# Patient Record
Sex: MALE | Race: WHITE | Employment: OTHER | URBAN - METROPOLITAN AREA
[De-identification: names, ages, dates, MRNs, and addresses within clinical notes are randomized per-mention and may not be internally consistent; named-entity substitution may affect disease eponyms.]

---

## 2017-01-24 ENCOUNTER — HOSPITAL ENCOUNTER (OUTPATIENT)
Dept: LAB | Age: 82
Discharge: HOME | End: 2017-01-24
Payer: MEDICARE

## 2017-01-24 ENCOUNTER — OFFICE VISIT (OUTPATIENT)
Dept: INTERNAL MEDICINE CLINIC | Age: 82
End: 2017-01-24

## 2017-01-24 VITALS
DIASTOLIC BLOOD PRESSURE: 74 MMHG | BODY MASS INDEX: 20.79 KG/M2 | HEIGHT: 74 IN | WEIGHT: 162 LBS | SYSTOLIC BLOOD PRESSURE: 148 MMHG | TEMPERATURE: 98.3 F | OXYGEN SATURATION: 98 % | HEART RATE: 84 BPM | RESPIRATION RATE: 20 BRPM

## 2017-01-24 DIAGNOSIS — E78.2 MIXED HYPERLIPIDEMIA: ICD-10-CM

## 2017-01-24 DIAGNOSIS — F02.80 LATE ONSET ALZHEIMER'S DISEASE WITHOUT BEHAVIORAL DISTURBANCE (HCC): ICD-10-CM

## 2017-01-24 DIAGNOSIS — E55.9 VITAMIN D DEFICIENCY: ICD-10-CM

## 2017-01-24 DIAGNOSIS — I25.10 CORONARY ARTERY DISEASE DUE TO LIPID RICH PLAQUE: ICD-10-CM

## 2017-01-24 DIAGNOSIS — G30.1 LATE ONSET ALZHEIMER'S DISEASE WITHOUT BEHAVIORAL DISTURBANCE (HCC): ICD-10-CM

## 2017-01-24 DIAGNOSIS — R63.4 WEIGHT LOSS: ICD-10-CM

## 2017-01-24 DIAGNOSIS — R41.0 CONFUSION: Primary | ICD-10-CM

## 2017-01-24 DIAGNOSIS — I25.83 CORONARY ARTERY DISEASE DUE TO LIPID RICH PLAQUE: ICD-10-CM

## 2017-01-24 DIAGNOSIS — R45.1 AGITATION: ICD-10-CM

## 2017-01-24 LAB
BILIRUB UR QL STRIP: NEGATIVE
GLUCOSE UR-MCNC: NEGATIVE MG/DL
KETONES P FAST UR STRIP-MCNC: NEGATIVE MG/DL
PH UR STRIP: 6 [PH] (ref 4.6–8)
PROT UR QL STRIP: NORMAL MG/DL
SP GR UR STRIP: 1.02 (ref 1–1.03)
UA UROBILINOGEN AMB POC: NORMAL (ref 0.2–1)
URINALYSIS CLARITY POC: CLEAR
URINALYSIS COLOR POC: YELLOW
URINE BLOOD POC: NEGATIVE
URINE LEUKOCYTES POC: NORMAL
URINE NITRITES POC: NEGATIVE

## 2017-01-24 PROCEDURE — 85025 COMPLETE CBC W/AUTO DIFF WBC: CPT

## 2017-01-24 PROCEDURE — 87086 URINE CULTURE/COLONY COUNT: CPT

## 2017-01-24 PROCEDURE — 80053 COMPREHEN METABOLIC PANEL: CPT

## 2017-01-24 PROCEDURE — 82306 VITAMIN D 25 HYDROXY: CPT

## 2017-01-24 NOTE — MR AVS SNAPSHOT
Visit Information Date & Time Provider Department Dept. Phone Encounter #  
 1/24/2017  1:45 PM Amadou Arroyo MD [facility] Internal Medicine [facility] [phone] [id number] Your Appointments 1/24/2017  1:45 PM  
ACUTE CARE with Amadou Arroyo MD  
[address] ([facility]) Appt Note: irritated and confused [address] [place] [address]. [place] [id number] [phone]  
  
   
 [address] [place] [address]. [address] [place] [phone]  
  
    
 2/21/2017 12:30 PM  
ROUTINE CARE with Amadou Arroyo MD  
[address] ([facility]) Appt Note: 1 month follow up [address] [place] [address]. [place] [id number] [phone]  
  
   
 [address] [place] [address]. [address] [place] [phone] Upcoming Health Maintenance Date Due DTaP/Tdap/Td series (1 - Tdap) 2/3/1955 Pneumococcal 65+ Low/Medium Risk (1 of 2 - PCV13) 2/3/1999 INFLUENZA AGE 9 TO ADULT 8/1/2016 MEDICARE YEARLY EXAM 11/4/2016 GLAUCOMA SCREENING Q2Y 4/18/2018 Allergies as of 1/24/2017  Review Complete On: 1/24/2017 By: Amadou Arroyo MD  
  
 Severity Noted Reaction Type Reactions Chocolate [Cocoa]  12/16/2014    Sneezing Namenda [Memantine]  12/16/2014    Shortness of Breath Current Immunizations  Reviewed on 3/12/2015 Name Date Influenza Vaccine 11/1/2014 Zoster Vaccine, Live 12/23/2014 Not reviewed this visit You Were Diagnosed With   
  
 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  
 20.8 kg/m 2 1.96 m 2 Preferred Pharmacy Pharmacy Name Phone [facility] [phone] Your Updated Medication List  
  
   
This list is accurate as of: 1/24/17 12:33 PM.  Always use your most recent med list.  
  
  
  
  
 aspirin 325 mg tablet Commonly known as:  ASPIRIN Take 325 mg by mouth daily. atenolol 25 mg tablet Commonly known as:  TENORMIN Take 25 mg by mouth daily. donepezil 10 mg tablet Commonly known as:  ARICEPT Take 10 mg by mouth nightly. food supplemt, lactose-reduced Liqd Commonly known as:  ENSURE Take 237 mL by mouth daily. hydrOXYzine HCl 10 mg tablet Commonly known as:  ATARAX Take  by mouth three (3) times daily as needed for Itching. LEXAPRO 10 mg tablet Generic drug:  escitalopram oxalate Take 10 mg by mouth daily. Take 1/2 daily  
  
 ofloxacin 0.3 % ophthalmic solution Commonly known as:  FLOXIN Administer 3 Drops to both eyes four (4) times daily. prednisoLONE acetate 1 % ophthalmic suspension Commonly known as:  PRED FORTE Administer 1 Drop to both eyes four (4) times daily. simvastatin 20 mg tablet Commonly known as:  ZOCOR  
TAKE 1 TABLET BY MOUTH NIGHTLY. STOP ZOCOR 40MG. therapeutic multivitamin tablet Commonly known as:  [facility] Take 1 Tab by mouth daily. Prescriptions Sent to Pharmacy Refills food supplemt, lactose-reduced (ENSURE) liqd 3 Sig: Take 237 mL by mouth daily. Class: Normal  
 Pharmacy: [address]  #: [phone] Route: Oral  
  
We Performed the Following AMB POC URINALYSIS DIP STICK MANUAL W/O MICRO [02306 CPT(R)] CBC WITH AUTOMATED DIFF [55211 CPT(R)] CULTURE, URINE [id number] CPT(R)] METABOLIC PANEL, COMPREHENSIVE [06777 CPT(R)] VITAMIN D, 25 HYDROXY [id number] CPT(R)] Patient Instructions Helping A Person With Dementia: Care Instructions Your Care Instructions Dementia is a loss of mental skills that affects daily life. It is different from mild memory loss that occurs with aging. Dementia can cause problems with memory, thinking clearly, and planning. It is different for everyone. But it usually gets worse slowly. Some people who have dementia can function well for a long time. But at some point it may become hard for the person to care for himself or herself. It can be upsetting to learn that a loved one has this condition. You may be afraid and worried about what will happen. You may wonder how you will care for the person. There is no cure for dementia. But medicine may be able to slow memory loss and improve thinking for a while. Other medicines may help with sleep, depression, and behavior changes. Dementia is different for everyone. In some cases, people can function well for a long time. You can help your loved one by making his or her home life easier and safer. You also need to take care of yourself. Caregiving can be stressful. But support is available to help you and give you a break when you need it. The Alzheimer's Association offers good information and support. If you are caring for someone with dementia, you can help make life safer and more comfortable. You can also help your loved one make decisions about future care. You may also want to bring up legal and financial issues. These are hard but important conversations to have. Follow-up care is a key part of your loved one's treatment and safety. Be sure to make and go to all appointments, and call your doctor if your loved one is having problems. It's also a good idea to know your loved one's test results and keep a list of the medicines he or she takes. How can you care for your loved one at home? Taking care of the person · If the person takes medicine for dementia, help him or her take it exactly as prescribed. Call the doctor if you notice any problems with the medicine. · Make a list of the person's medicines. Review it with all of his or her doctors. · Help the person eat a balanced diet. Serve plenty of whole grains, fruits, and vegetables every day. If the person is not hungry at mealtimes, give snacks at midmorning and in the afternoon. Offer drinks such as Boost, Ensure, or Sustacal if the person is losing weight. · Encourage exercise. Walking and other activities may slow the decline of mental ability. Help the person stay active mentally with reading, crossword puzzles, or other hobbies. · Take steps to help if the person is sundowning. This is the restless behavior and trouble with sleeping that may occur in late afternoon and at night. Try not to let the person nap during the day. Offer a glass of warm milk or caffeine-free tea before bedtime. · Develop a routine. Your loved one will feel less frustrated or confused with a clear, simple plan of what to do every day. · Be patient. A task may take the person longer than it used to. · For as long as he or she is able, allow your loved one to make decisions about activities, food, clothing, and other choices. Let him or her be independent, even if tasks take more time or are not done perfectly. Tailor tasks to the person's abilities. For example, if cooking is no longer safe, ask for other help.  Your loved one can help set the table, or make simple dishes such as a salad. When the person needs help, offer it gently. Staying safe · Make your home (or your loved one's home) safe. Tack down rugs, and put no-slip tape in the tub. Install handrails, and put safety switches on stoves and appliances. Keep rooms free of clutter. Make sure walkways around furniture are clear. Do not move furniture around, because the person may become confused. · Use locks on doors and cupboards. Lock up knives, scissors, medicines, cleaning supplies, and other dangerous things. · Do not let the person drive or cook if he or she can't do it safely. A person with dementia should not drive unless he or she is able to pass an on-road driving test. Your state 's license bureau can do a driving test if there is any question. · Get medical alert jewelry for the person so that you can be contacted if he or she wanders away. If possible, provide a safe place for wandering, such as an enclosed yard or garden. Taking care of yourself · Ask your doctor about support groups and other resources in your area. · Take care of your health. Be sure to eat healthy foods and get enough rest and exercise. · Take time for yourself. Respite services provide someone to stay with the person for a short time while you get out of the house for a few hours. · Make time for an activity that you enjoy. Read, listen to music, paint, do crafts, or play an instrument, even if it's only for a few minutes a day. · Spend time with family, friends, and others in your support system. When should you call for help? Call 911 anytime you think the person may need emergency care. For example, call if: · The person who has dementia wanders away and you can't find him or her. · The person who has dementia is seriously injured. Call the doctor now or seek immediate medical care if: · The person suddenly sees things that are not there (hallucinates). · The person has a sudden change in his or her behavior. Watch closely for changes in the person's health, and be sure to contact the doctor if: · The person has symptoms that could cause injury. · The person has problems with his or her medicine. · You need more information to care for a person with dementia. · You need respite care so you can take a break. Where can you learn more? Go to http://kathe-millicent.info/. Enter R935 in the search box to learn more about \"Helping A Person With Dementia: Care Instructions. \" Current as of: July 26, 2016 Content Version: 11.1 © 6293-3375 [employer]. Care instructions adapted under license by [employer] (which disclaims liability or warranty for this information). If you have questions about a medical condition or this instruction, always ask your healthcare professional. [address] any warranty or liability for your use of this information. Introducing [facility]! Katiuska Quintana introduces Qlibri patient portal. Now you can access parts of your medical record, email your doctor's office, and request medication refills online. 1. In your internet browser, go to [email or web address] 2. Click on the First Time User? Click Here link in the Sign In box. You will see the New Member Sign Up page. 3. Enter your Qlibri Access Code exactly as it appears below. You will not need to use this code after youve completed the sign-up process. If you do not sign up before the expiration date, you must request a new code. · Qlibri Access Code: [email or web address] Expires: 4/24/2017 12:24 PM 
 
4. Enter the last four digits of your Social Security Number (xxxx) and Date of Birth (mm/dd/yyyy) as indicated and click Submit. You will be taken to the next sign-up page. 5. Create a Qlibri ID.  This will be your Qlibri login ID and cannot be changed, so think of one that is secure and easy to remember. 6. Create a Dolosys password. You can change your password at any time. 7. Enter your Password Reset Question and Answer. This can be used at a later time if you forget your password. 8. Enter your e-mail address. You will receive e-mail notification when new information is available in [address] 9. Click Sign Up. You can now view and download portions of your medical record. 10. Click the Download Summary menu link to download a portable copy of your medical information. If you have questions, please visit the Frequently Asked Questions section of the Dolosys website. Remember, Dolosys is NOT to be used for urgent needs. For medical emergencies, dial 911. Now available from your iPhone and Android! Please provide this summary of care documentation to your next provider. Your primary care clinician is listed as Darrel Mendoza. If you have any questions after today's visit, please call [phone].

## 2017-01-24 NOTE — PROGRESS NOTES
Health Maintenance Due   Topic Date Due    DTaP/Tdap/Td series (1 - Tdap) 02/03/1955    Pneumococcal 65+ Low/Medium Risk (1 of 2 - PCV13) 02/03/1999    INFLUENZA AGE 9 TO ADULT  08/01/2016    MEDICARE YEARLY EXAM  11/04/2016       Chief Complaint   Patient presents with    Altered mental status     agitated and aggressive and confused       1. Have you been to the ER, urgent care clinic since your last visit? Hospitalized since your last visit? No    2. Have you seen or consulted any other health care providers outside of the [address] [place] [address] since your last visit? Include any pap smears or colon screening. No    3) Do you have an Advance Directive on file? no    4) Are you interested in receiving information on Advance Directives? NO      Patient is accompanied by self I have received verbal consent from [facility] to discuss any/all medical information while they are present in the room.       Results for orders placed or performed in visit on 01/24/17   AMB POC URINALYSIS DIP STICK MANUAL W/O MICRO   Result Value Ref Range    Color (UA POC) Yellow     Clarity (UA POC) Clear     Glucose (UA POC) Negative Negative    Bilirubin (UA POC) Negative Negative    Ketones (UA POC) Negative Negative    Specific gravity (UA POC) 1.025 1.001 - 1.035    Blood (UA POC) Negative Negative    pH (UA POC) 6.0 4.6 - 8.0    Protein (UA POC) Trace Negative mg/dL    Urobilinogen (UA POC) 0.2 mg/dL 0.2 - 1    Nitrites (UA POC) Negative Negative    Leukocyte esterase (UA POC) Trace Negative

## 2017-01-24 NOTE — PROGRESS NOTES
HISTORY OF PRESENT ILLNESS  Shahana Correia is a 80 y.o. male here  With agitation. I don't think he is confused. no frequency of urination. he is frustrated about food served in dining room. he has lost 20 pound weight. Have CAD,stable. on asa.no chest pain or SOB. Depression is stable. has memory problem. doing well.will see psych soon. Reports compliance with medications and diet. Med list and most recent labs/studies reviewed with pt. Not active physically to control weight. Needs med refills. Reports no other new c/o. Altered mental status    His past medical history is significant for hypertension. Weight Loss     Hypertension    Pertinent negatives include no blurred vision. Diarrhea    Pertinent negatives include no chills. Dementia    His past medical history is significant for hypertension. Palpitations   Pertinent negatives include no fever. His past medical history is significant for hypertension. Review of Systems   Constitutional: Negative. Negative for chills and fever. HENT: Negative. Eyes: Negative. Negative for blurred vision, double vision and photophobia. Respiratory: Negative. Cardiovascular: Negative. Gastrointestinal: Negative. Genitourinary: Negative. Musculoskeletal: Negative. Skin: Negative. Neurological: Negative. Endo/Heme/Allergies: Negative. Psychiatric/Behavioral: Positive for depression and memory loss. Negative for substance abuse and suicidal ideas. Physical Exam   Constitutional: He appears well-developed and well-nourished. No distress. Neck: Normal range of motion. Neck supple. No JVD present. No thyromegaly present. Cardiovascular: Normal rate, regular rhythm, normal heart sounds and intact distal pulses. Pulmonary/Chest: Effort normal and breath sounds normal. No respiratory distress. He has no wheezes. He has no rales. Abdominal: Soft. Bowel sounds are normal. He exhibits no distension. There is no tenderness.    KUB NT Musculoskeletal: He exhibits no edema or tenderness. Lymphadenopathy:     He has no cervical adenopathy. Psychiatric: He has a normal mood and affect. His behavior is normal.   Depression. demented       ASSESSMENT and PLAN  Phyllis Cline was seen today for altered mental status, weight loss and hypertension. Diagnoses and all orders for this visit:    Confusion  -     AMB POC URINALYSIS DIP STICK MANUAL W/O MICRO  tarce blood and leuc. Will sent out,  -     CULTURE, URINE    Agitation  I think he  is upset about food offered in cafeteria. Will do,  -     CULTURE, URINE  -     CBC WITH AUTOMATED DIFF  -     METABOLIC PANEL, COMPREHENSIVE    Late onset Alzheimer's disease without behavioral disturbance  Adv on med. He will see  psychitrist.adv his daughter to talk about exelon instead of aricept. Coronary artery disease due to lipid rich plaque    Stable. -     CBC WITH AUTOMATED DIFF  -     METABOLIC PANEL, COMPREHENSIVE    Mixed hyperlipidemia  -     METABOLIC PANEL, COMPREHENSIVE    Vitamin D deficiency  -     VITAMIN D, 25 HYDROXY    Weight loss    Lost 20 pound weight. Will add,  -     food supplemt, lactose-reduced (ENSURE) liqd; Take 237 mL by mouth daily. Return if symptoms worsen or fail to improve. Advised him to call back or return to office if symptoms worsen/change/persist.   Discussed expected course/resolution/complications of diagnosis in detail with patient. Medication risks/benefits/costs/interactions/alternatives discussed with patient. He was given an after visit summary which includes diagnoses, current medications, & vitals. He expressed understanding with the diagnosis and plan.

## 2017-01-24 NOTE — PATIENT INSTRUCTIONS
Helping A Person With Dementia: Care Instructions  Your Care Instructions  Dementia is a loss of mental skills that affects daily life. It is different from mild memory loss that occurs with aging. Dementia can cause problems with memory, thinking clearly, and planning. It is different for everyone. But it usually gets worse slowly. Some people who have dementia can function well for a long time. But at some point it may become hard for the person to care for himself or herself. It can be upsetting to learn that a loved one has this condition. You may be afraid and worried about what will happen. You may wonder how you will care for the person. There is no cure for dementia. But medicine may be able to slow memory loss and improve thinking for a while. Other medicines may help with sleep, depression, and behavior changes. Dementia is different for everyone. In some cases, people can function well for a long time. You can help your loved one by making his or her home life easier and safer. You also need to take care of yourself. Caregiving can be stressful. But support is available to help you and give you a break when you need it. The Alzheimer's Association offers good information and support. If you are caring for someone with dementia, you can help make life safer and more comfortable. You can also help your loved one make decisions about future care. You may also want to bring up legal and financial issues. These are hard but important conversations to have. Follow-up care is a key part of your loved one's treatment and safety. Be sure to make and go to all appointments, and call your doctor if your loved one is having problems. It's also a good idea to know your loved one's test results and keep a list of the medicines he or she takes. How can you care for your loved one at home? Taking care of the person  · If the person takes medicine for dementia, help him or her take it exactly as prescribed. Call the doctor if you notice any problems with the medicine. · Make a list of the person's medicines. Review it with all of his or her doctors. · Help the person eat a balanced diet. Serve plenty of whole grains, fruits, and vegetables every day. If the person is not hungry at mealtimes, give snacks at midmorning and in the afternoon. Offer drinks such as Boost, Ensure, or Sustacal if the person is losing weight. · Encourage exercise. Walking and other activities may slow the decline of mental ability. Help the person stay active mentally with reading, crossword puzzles, or other hobbies. · Take steps to help if the person is sundowning. This is the restless behavior and trouble with sleeping that may occur in late afternoon and at night. Try not to let the person nap during the day. Offer a glass of warm milk or caffeine-free tea before bedtime. · Develop a routine. Your loved one will feel less frustrated or confused with a clear, simple plan of what to do every day. · Be patient. A task may take the person longer than it used to. · For as long as he or she is able, allow your loved one to make decisions about activities, food, clothing, and other choices. Let him or her be independent, even if tasks take more time or are not done perfectly. Tailor tasks to the person's abilities. For example, if cooking is no longer safe, ask for other help. Your loved one can help set the table, or make simple dishes such as a salad. When the person needs help, offer it gently. Staying safe  · Make your home (or your loved one's home) safe. Tack down rugs, and put no-slip tape in the tub. Install handrails, and put safety switches on stoves and appliances. Keep rooms free of clutter. Make sure walkways around furniture are clear. Do not move furniture around, because the person may become confused. · Use locks on doors and cupboards.  Lock up knives, scissors, medicines, cleaning supplies, and other dangerous things. · Do not let the person drive or cook if he or she can't do it safely. A person with dementia should not drive unless he or she is able to pass an on-road driving test. Your state 's license bureau can do a driving test if there is any question. · Get medical alert jewelry for the person so that you can be contacted if he or she wanders away. If possible, provide a safe place for wandering, such as an enclosed yard or garden. Taking care of yourself  · Ask your doctor about support groups and other resources in your area. · Take care of your health. Be sure to eat healthy foods and get enough rest and exercise. · Take time for yourself. Respite services provide someone to stay with the person for a short time while you get out of the house for a few hours. · Make time for an activity that you enjoy. Read, listen to music, paint, do crafts, or play an instrument, even if it's only for a few minutes a day. · Spend time with family, friends, and others in your support system. When should you call for help? Call 911 anytime you think the person may need emergency care. For example, call if:  · The person who has dementia wanders away and you can't find him or her. · The person who has dementia is seriously injured. Call the doctor now or seek immediate medical care if:  · The person suddenly sees things that are not there (hallucinates). · The person has a sudden change in his or her behavior. Watch closely for changes in the person's health, and be sure to contact the doctor if:  · The person has symptoms that could cause injury. · The person has problems with his or her medicine. · You need more information to care for a person with dementia. · You need respite care so you can take a break. Where can you learn more? Go to http://kathe-millicent.info/. Enter R366 in the search box to learn more about \"Helping A Person With Dementia: Care Instructions. \"  Current as of: July 26, 2016  Content Version: 11.1  © 9451-6340 [employer]. Care instructions adapted under license by [employer] (which disclaims liability or warranty for this information). If you have questions about a medical condition or this instruction, always ask your healthcare professional. [place] [address] any warranty or liability for your use of this information.

## 2017-01-24 NOTE — LETTER
1/26/2017 11:56 AM 
 
Mr. Eleazar Bustos [address] [facility] [address] [id number] Dear Sadia Kenyon: 
 
Please find your most recent results below. Resulted Orders AMB POC URINALYSIS DIP STICK MANUAL W/O MICRO Result Value Ref Range Color (UA POC) Yellow Clarity (UA POC) Clear Glucose (UA POC) Negative Negative Bilirubin (UA POC) Negative Negative Ketones (UA POC) Negative Negative Specific gravity (UA POC) 1.025 1.001 - 1.035 Blood (UA POC) Negative Negative pH (UA POC) 6.0 4.6 - 8.0 Protein (UA POC) Trace Negative mg/dL Urobilinogen (UA POC) 0.2 mg/dL 0.2 - 1 Nitrites (UA POC) Negative Negative Leukocyte esterase (UA POC) Trace Negative CULTURE, URINE Result Value Ref Range Urine Culture, Routine No growth Narrative Performed at:  [address] [place] [address]  [id number] : Edwar Cotto MD, Phone:  [phone] CBC WITH AUTOMATED DIFF Result Value Ref Range WBC 8.1 3.4 - 10.8 x10E3/uL  
 RBC 4.45 4.14 - 5.80 x10E6/uL HGB 14.1 12.6 - 17.7 g/dL HCT 42.3 37.5 - 51.0 % MCV 95 79 - 97 fL  
 MCH 31.7 26.6 - 33.0 pg  
 MCHC 33.3 31.5 - 35.7 g/dL  
 RDW 14.0 12.3 - 15.4 % PLATELET 644 217 - 634 x10E3/uL NEUTROPHILS 62 % Lymphocytes 26 % MONOCYTES 10 % EOSINOPHILS 2 % BASOPHILS 0 %  
 ABS. NEUTROPHILS 5.0 1.4 - 7.0 x10E3/uL Abs Lymphocytes 2.1 0.7 - 3.1 x10E3/uL  
 ABS. MONOCYTES 0.8 0.1 - 0.9 x10E3/uL  
 ABS. EOSINOPHILS 0.1 0.0 - 0.4 x10E3/uL  
 ABS. BASOPHILS 0.0 0.0 - 0.2 x10E3/uL IMMATURE GRANULOCYTES 0 %  
 ABS. IMM. GRANS. 0.0 0.0 - 0.1 x10E3/uL Narrative Performed at:  [facility] [address] [place] [address]  [id number] : Lena Garcia MD, Phone:  [phone] METABOLIC PANEL, COMPREHENSIVE Result Value Ref Range Glucose 95 65 - 99 mg/dL BUN 17 8 - 27 mg/dL Creatinine 1.15 0.76 - 1.27 mg/dL GFR est non-AA 59 (L) >59 mL/min/1.73 GFR est AA 68 >59 mL/min/1.73  
 BUN/Creatinine ratio 15 10 - 22 Sodium 143 134 - 144 mmol/L Potassium 4.6 3.5 - 5.2 mmol/L Chloride 104 96 - 106 mmol/L  
 CO2 24 18 - 29 mmol/L Calcium 9.2 8.6 - 10.2 mg/dL Protein, total 6.5 6.0 - 8.5 g/dL Albumin 4.1 3.5 - 4.7 g/dL GLOBULIN, TOTAL 2.4 1.5 - 4.5 g/dL A-G Ratio 1.7 1.1 - 2.5 Bilirubin, total 1.0 0.0 - 1.2 mg/dL Alk. phosphatase 62 39 - 117 IU/L  
 AST 19 0 - 40 IU/L  
 ALT 13 0 - 44 IU/L Narrative Performed at:  [address]  [id number] : Martinez Mustafa MD, Phone:  [phone] VITAMIN D, 25 HYDROXY Result Value Ref Range VITAMIN D, 25-HYDROXY 38.9 30.0 - 100.0 ng/mL Comment:  
   Vitamin D deficiency has been defined by the [address] practice guideline as a 
level of serum 25-OH vitamin D less than 20 ng/mL (1,2). The Endocrine Society went on to further define vitamin D 
insufficiency as a level between 21 and 29 ng/mL (2). 1. IOM ([place] [employer]). 2010. Dietary reference 
   intakes for calcium and D. [facility]: The 
   [employer]. 2. Baylee MF, Miranda NC, Lavonne NIETO, et al. 
   Evaluation, treatment, and prevention of vitamin D 
   deficiency: an Endocrine Society clinical practice 
   guideline. JCEM. 2011 Jul; 96(7):1911-30. Narrative Performed at:  [address]  [id number] : Martinez Mustafa MD, Phone:  [phone] CKD REPORT Result Value Ref Range Interpretation Note Comment:  
   Supplement report is available. Narrative Performed at:  [address] [place] [address]  [id number] : Talha Guadalupe PhD, Phone:  [phone] RECOMMENDATIONS: 
None. Keep up the good work! Please call me if you have any questions: [phone] Sincerely, Rahul Rich MD

## 2017-01-25 ENCOUNTER — OFFICE VISIT (OUTPATIENT)
Dept: INTERNAL MEDICINE CLINIC | Age: 82
End: 2017-01-25

## 2017-01-25 VITALS
DIASTOLIC BLOOD PRESSURE: 52 MMHG | RESPIRATION RATE: 20 BRPM | BODY MASS INDEX: 22.07 KG/M2 | TEMPERATURE: 97.9 F | WEIGHT: 172 LBS | OXYGEN SATURATION: 97 % | HEART RATE: 72 BPM | SYSTOLIC BLOOD PRESSURE: 104 MMHG | HEIGHT: 74 IN

## 2017-01-25 DIAGNOSIS — K11.20 PAROTITIS: Primary | ICD-10-CM

## 2017-01-25 LAB
25(OH)D3+25(OH)D2 SERPL-MCNC: 38.9 NG/ML (ref 30–100)
ALBUMIN SERPL-MCNC: 4.1 G/DL (ref 3.5–4.7)
ALBUMIN/GLOB SERPL: 1.7 {RATIO} (ref 1.1–2.5)
ALP SERPL-CCNC: 62 IU/L (ref 39–117)
ALT SERPL-CCNC: 13 IU/L (ref 0–44)
AST SERPL-CCNC: 19 IU/L (ref 0–40)
BACTERIA UR CULT: NO GROWTH
BASOPHILS # BLD AUTO: 0 X10E3/UL (ref 0–0.2)
BASOPHILS NFR BLD AUTO: 0 %
BILIRUB SERPL-MCNC: 1 MG/DL (ref 0–1.2)
BUN SERPL-MCNC: 17 MG/DL (ref 8–27)
BUN/CREAT SERPL: 15 (ref 10–22)
CALCIUM SERPL-MCNC: 9.2 MG/DL (ref 8.6–10.2)
CHLORIDE SERPL-SCNC: 104 MMOL/L (ref 96–106)
CO2 SERPL-SCNC: 24 MMOL/L (ref 18–29)
CREAT SERPL-MCNC: 1.15 MG/DL (ref 0.76–1.27)
EOSINOPHIL # BLD AUTO: 0.1 X10E3/UL (ref 0–0.4)
EOSINOPHIL NFR BLD AUTO: 2 %
ERYTHROCYTE [DISTWIDTH] IN BLOOD BY AUTOMATED COUNT: 14 % (ref 12.3–15.4)
GLOBULIN SER CALC-MCNC: 2.4 G/DL (ref 1.5–4.5)
GLUCOSE SERPL-MCNC: 95 MG/DL (ref 65–99)
HCT VFR BLD AUTO: 42.3 % (ref 37.5–51)
HGB BLD-MCNC: 14.1 G/DL (ref 12.6–17.7)
IMM GRANULOCYTES # BLD: 0 X10E3/UL (ref 0–0.1)
IMM GRANULOCYTES NFR BLD: 0 %
INTERPRETATION: NORMAL
LYMPHOCYTES # BLD AUTO: 2.1 X10E3/UL (ref 0.7–3.1)
LYMPHOCYTES NFR BLD AUTO: 26 %
MCH RBC QN AUTO: 31.7 PG (ref 26.6–33)
MCHC RBC AUTO-ENTMCNC: 33.3 G/DL (ref 31.5–35.7)
MCV RBC AUTO: 95 FL (ref 79–97)
MONOCYTES # BLD AUTO: 0.8 X10E3/UL (ref 0.1–0.9)
MONOCYTES NFR BLD AUTO: 10 %
NEUTROPHILS # BLD AUTO: 5 X10E3/UL (ref 1.4–7)
NEUTROPHILS NFR BLD AUTO: 62 %
PLATELET # BLD AUTO: 238 X10E3/UL (ref 150–379)
POTASSIUM SERPL-SCNC: 4.6 MMOL/L (ref 3.5–5.2)
PROT SERPL-MCNC: 6.5 G/DL (ref 6–8.5)
RBC # BLD AUTO: 4.45 X10E6/UL (ref 4.14–5.8)
SODIUM SERPL-SCNC: 143 MMOL/L (ref 134–144)
WBC # BLD AUTO: 8.1 X10E3/UL (ref 3.4–10.8)

## 2017-01-25 RX ORDER — AMOXICILLIN AND CLAVULANATE POTASSIUM 875; 125 MG/1; MG/1
1 TABLET, FILM COATED ORAL EVERY 12 HOURS
Qty: 20 TAB | Refills: 0 | Status: SHIPPED | OUTPATIENT
Start: 2017-01-25 | End: 2017-02-21 | Stop reason: ALTCHOICE

## 2017-01-25 NOTE — PATIENT INSTRUCTIONS
Parotitis: Care Instructions  Your Care Instructions  Parotitis is a painful swelling of your parotid glands, which are salivary glands located between the ear and jaw. The most common cause is a virus, such as mumps, herpes, or Olga-Barr. Bacterial infections, diabetes, tumors or stones in the saliva glands, and tooth problems also may cause parotitis. Follow-up care is a key part of your treatment and safety. Be sure to make and go to all appointments, and call your doctor if you are having problems. It's also a good idea to know your test results and keep a list of the medicines you take. How can you care for yourself at home? · Use an over-the-counter pain medicine if needed, such as acetaminophen (Tylenol), ibuprofen (Advil, Motrin), or naproxen (Aleve). Be safe with medicines. Read and follow all instructions on the label. Do not give aspirin to anyone younger than 20. It has been linked to Reye syndrome, a serious illness. · Put an ice or heat pack (whichever feels better) on the swollen jaw for 10 to 20 minutes at a time. Put a thin cloth between the ice or heat pack and the skin. · Suck on ice chips or ice treats such as Popsicles. Eat soft foods that do not have to be chewed much. Do not eat sour foods or liquids. If your salivary glands are very sore, eating these foods will usually cause them to hurt more. · If your doctor prescribed antibiotics, take them as directed. Do not stop taking them just because you feel better. You need to take the full course of antibiotics. To prevent tooth problems  · Brush and floss every day, and have regular dental checkups. · Eat a healthy diet, and avoid sugary foods and drinks. · Do not smoke or use spit tobacco. Tobacco use slows your ability to heal. It also increases your risk for gum disease and cancer of the mouth and throat. If you need help quitting, talk to your doctor about stop-smoking programs and medicines.  These can increase your chances of quitting for good. When should you call for help? Call 911 anytime you think you may need emergency care. For example, call if:  · You are confused, you do not know where you are, or you are extremely sleepy or hard to wake up. · You have a seizure. Call your doctor now or seek immediate medical care if:  · You have belly pain. · You have a fever with a stiff neck or a severe headache. · Your fever goes up. · Your testicles hurt and are tender. · You have trouble opening your mouth. · You have trouble breathing. · You have increased trouble swallowing. Watch closely for changes in your health, and be sure to contact your doctor if:  · You do not feel better after 10 days of home treatment. Where can you learn more? Go to http://kathe-millicent.info/. Enter H335 in the search box to learn more about \"Parotitis: Care Instructions. \"  Current as of: July 29, 2016  Content Version: 11.1  © 7807-5000 [employer]. Care instructions adapted under license by [employer] (which disclaims liability or warranty for this information). If you have questions about a medical condition or this instruction, always ask your healthcare professional. [place] [address] any warranty or liability for your use of this information.

## 2017-01-25 NOTE — MR AVS SNAPSHOT
Visit Information Date & Time Provider Department Dept. Phone Encounter #  
 1/25/2017  2:00 PM Amarilis Ledbetter, [facility] [phone] [id number] Your Appointments 2/21/2017 12:30 PM  
ROUTINE CARE with Elayne Soulier, MD  
[address] ([facility]) Appt Note: 1 month follow up [address] [place] [address]. [place] [id number] [phone]  
  
   
 [address] [place] [address]. [address] [place] [phone] Upcoming Health Maintenance Date Due DTaP/Tdap/Td series (1 - Tdap) 2/3/1955 Pneumococcal 65+ Low/Medium Risk (1 of 2 - PCV13) 2/3/1999 INFLUENZA AGE 9 TO ADULT 8/1/2016 MEDICARE YEARLY EXAM 11/4/2016 GLAUCOMA SCREENING Q2Y 4/18/2018 Allergies as of 1/25/2017  Review Complete On: 1/25/2017 By: Amarilis Ledbetter NP Severity Noted Reaction Type Reactions Chocolate [Cocoa]  12/16/2014    Sneezing Namenda [Memantine]  12/16/2014    Shortness of Breath Current Immunizations  Reviewed on 3/12/2015 Name Date Influenza Vaccine 11/1/2014 Zoster Vaccine, Live 12/23/2014 Not reviewed this visit You Were Diagnosed With   
  
 Codes Comments Parotitis    -  Primary ICD-10-CM: K11.20 ICD-9-CM: 527.2 Vitals BP Pulse Temp Resp Height(growth percentile) Weight(growth percentile) 104/52 72 97.9 °F (36.6 °C) (Oral) 20 6' 2\" (1.88 m) 172 lb (78 kg) SpO2 BMI Smoking Status 97% 22.08 kg/m2 Former Smoker BMI and BSA Data Body Mass Index Body Surface Area 22.08 kg/m 2 2.02 m 2 Preferred Pharmacy Pharmacy Name Phone [facility]. [phone] Your Updated Medication List  
  
   
This list is accurate as of: 1/25/17  2:30 PM.  Always use your most recent med list.  
  
  
  
  
 amoxicillin-clavulanate 875-125 mg per tablet Commonly known as:  AUGMENTIN  
 Take 1 Tab by mouth every twelve (12) hours. aspirin 325 mg tablet Commonly known as:  ASPIRIN Take 325 mg by mouth daily. atenolol 25 mg tablet Commonly known as:  TENORMIN Take 25 mg by mouth daily. donepezil 10 mg tablet Commonly known as:  ARICEPT Take 10 mg by mouth nightly. food supplemt, lactose-reduced Liqd Commonly known as:  ENSURE Take 237 mL by mouth daily. hydrOXYzine HCl 10 mg tablet Commonly known as:  ATARAX Take  by mouth three (3) times daily as needed for Itching. LEXAPRO 10 mg tablet Generic drug:  escitalopram oxalate Take 10 mg by mouth daily. Take 1/2 daily  
  
 ofloxacin 0.3 % ophthalmic solution Commonly known as:  FLOXIN Administer 3 Drops to both eyes four (4) times daily. prednisoLONE acetate 1 % ophthalmic suspension Commonly known as:  PRED FORTE Administer 1 Drop to both eyes four (4) times daily. simvastatin 20 mg tablet Commonly known as:  ZOCOR  
TAKE 1 TABLET BY MOUTH NIGHTLY. STOP ZOCOR 40MG. therapeutic multivitamin tablet Commonly known as:  [facility] Take 1 Tab by mouth daily. Prescriptions Sent to Pharmacy Refills  
 amoxicillin-clavulanate (AUGMENTIN) 875-125 mg per tablet 0 Sig: Take 1 Tab by mouth every twelve (12) hours. Class: Normal  
 Pharmacy: [address] Ph #: [phone] Route: Oral  
  
Patient Instructions Parotitis: Care Instructions Your Care Instructions Parotitis is a painful swelling of your parotid glands, which are salivary glands located between the ear and jaw. The most common cause is a virus, such as mumps, herpes, or Olga-Barr. Bacterial infections, diabetes, tumors or stones in the saliva glands, and tooth problems also may cause parotitis. Follow-up care is a key part of your treatment and safety.  Be sure to make and go to all appointments, and call your doctor if you are having problems. It's also a good idea to know your test results and keep a list of the medicines you take. How can you care for yourself at home? · Use an over-the-counter pain medicine if needed, such as acetaminophen (Tylenol), ibuprofen (Advil, Motrin), or naproxen (Aleve). Be safe with medicines. Read and follow all instructions on the label. Do not give aspirin to anyone younger than 20. It has been linked to Reye syndrome, a serious illness. · Put an ice or heat pack (whichever feels better) on the swollen jaw for 10 to 20 minutes at a time. Put a thin cloth between the ice or heat pack and the skin. · Suck on ice chips or ice treats such as Popsicles. Eat soft foods that do not have to be chewed much. Do not eat sour foods or liquids. If your salivary glands are very sore, eating these foods will usually cause them to hurt more. · If your doctor prescribed antibiotics, take them as directed. Do not stop taking them just because you feel better. You need to take the full course of antibiotics. To prevent tooth problems · Brush and floss every day, and have regular dental checkups. · Eat a healthy diet, and avoid sugary foods and drinks. · Do not smoke or use spit tobacco. Tobacco use slows your ability to heal. It also increases your risk for gum disease and cancer of the mouth and throat. If you need help quitting, talk to your doctor about stop-smoking programs and medicines. These can increase your chances of quitting for good. When should you call for help? Call 911 anytime you think you may need emergency care. For example, call if: 
· You are confused, you do not know where you are, or you are extremely sleepy or hard to wake up. · You have a seizure. Call your doctor now or seek immediate medical care if: 
· You have belly pain. · You have a fever with a stiff neck or a severe headache. · Your fever goes up. · Your testicles hurt and are tender. · You have trouble opening your mouth. · You have trouble breathing. · You have increased trouble swallowing. Watch closely for changes in your health, and be sure to contact your doctor if: 
· You do not feel better after 10 days of home treatment. Where can you learn more? Go to http://kathe-millicent.info/. Enter B881 in the search box to learn more about \"Parotitis: Care Instructions. \" Current as of: July 29, 2016 Content Version: 11.1 © 0877-1368 [employer]. Care instructions adapted under license by [employer] (which disclaims liability or warranty for this information). If you have questions about a medical condition or this instruction, always ask your healthcare professional. [place] [address] any warranty or liability for your use of this information. Introducing hospitals & HEALTH SERVICES! [facility] introduces Applika patient portal. Now you can access parts of your medical record, email your doctor's office, and request medication refills online. 1. In your internet browser, go to [email or web address] 2. Click on the First Time User? Click Here link in the Sign In box. You will see the New Member Sign Up page. 3. Enter your Applika Access Code exactly as it appears below. You will not need to use this code after youve completed the sign-up process. If you do not sign up before the expiration date, you must request a new code. · Applika Access Code: [email or web address] Expires: 4/24/2017 12:24 PM 
 
4. Enter the last four digits of your Social Security Number (xxxx) and Date of Birth (mm/dd/yyyy) as indicated and click Submit. You will be taken to the next sign-up page. 5. Create a Applika ID. This will be your Applika login ID and cannot be changed, so think of one that is secure and easy to remember. 6. Create a Lumiary password. You can change your password at any time. 7. Enter your Password Reset Question and Answer. This can be used at a later time if you forget your password. 8. Enter your e-mail address. You will receive e-mail notification when new information is available in [address] 9. Click Sign Up. You can now view and download portions of your medical record. 10. Click the Download Summary menu link to download a portable copy of your medical information. If you have questions, please visit the Frequently Asked Questions section of the Lumiary website. Remember, Lumiary is NOT to be used for urgent needs. For medical emergencies, dial 911. Now available from your iPhone and Android! Please provide this summary of care documentation to your next provider. Your primary care clinician is listed as Harvinder Urena. If you have any questions after today's visit, please call [phone].

## 2017-01-25 NOTE — PROGRESS NOTES
HISTORY OF PRESENT ILLNESS  Matt Lagos is a 80 y.o. male. This is a patient of Dr. Rachele Brown who presents today with his daughter with complaints of ear and cheek pain. The patient woke up this morning with pain below his right ear. He noted some tenderness with palpation. His daughter noted swelling to affected area. The patient also mentions some pain to his left abdomen and left ribs. Pain occurs with certain movements and deep breaths. Patient states he moved some furniture yesterday which may have caused pain. Visit Vitals    /52    Pulse 72    Temp 97.9 °F (36.6 °C) (Oral)    Resp 20    Ht 6' 2\" (1.88 m)    Wt 172 lb (78 kg)    SpO2 97%    BMI 22.08 kg/m2     HPI    Review of Systems   Constitutional: Negative for chills and fever. HENT: Positive for ear pain. Negative for congestion and sore throat. Respiratory: Negative for cough, shortness of breath and wheezing. Cardiovascular: Negative for chest pain, palpitations and leg swelling. Gastrointestinal: Negative for abdominal pain, constipation, diarrhea, nausea and vomiting. Genitourinary: Negative. Musculoskeletal: Negative. Skin: Negative. Neurological: Negative for dizziness and headaches. Endo/Heme/Allergies: Negative. Psychiatric/Behavioral: Negative. Physical Exam   Constitutional: He appears well-developed and well-nourished. No distress. HENT:   Head: Normocephalic and atraumatic. Right Ear: External ear normal.   Left Ear: External ear normal.   Nose: Nose normal.   Mouth/Throat: Oropharynx is clear and moist. No oropharyngeal exudate. Swelling noted beneath right ear, along jaw line- tender to touch- mildly warm, no redness noted. Eyes: Conjunctivae are normal.   Cardiovascular: Normal rate, regular rhythm, normal heart sounds and intact distal pulses. No murmur heard. Pulmonary/Chest: Effort normal and breath sounds normal. No respiratory distress. He has no wheezes.  He has no rales. He exhibits no tenderness. Abdominal: Soft. Bowel sounds are normal. He exhibits no distension. There is no tenderness. There is no rebound and no guarding. Musculoskeletal: He exhibits no edema. Lymphadenopathy:     He has no cervical adenopathy. Neurological: He is alert. Oriented to self and place   Skin: Skin is warm and dry. Psychiatric: He has a normal mood and affect. Nursing note and vitals reviewed. ASSESSMENT and PLAN    ICD-10-CM ICD-9-CM    1. Parotitis K11.20 527.2 Will order  amoxicillin-clavulanate (AUGMENTIN) 875-125 mg per tablet, 1 tab po bid x 10 days. Heat to affected area tid x 5 days. May have sour candy. Advised patient to avoid heavy lifting/moving furniture if possible. Heat to affected area of left side for comfort. Patient encouraged to call or return to office if symptoms do not improve or worsen. If experiencing severe shortness of breath or chest pain, present to the ER. If experiencing severe abdominal pain, blood in stool or other emergency, present to ER. Lab results and schedule of future lab studies reviewed with patient  Reviewed diet, exercise and weight control  Reviewed medications and side effects in detail  Patient encouraged to call or return to office if symptoms do not improve or worsen. Reviewed plan of care with patient who acknowledges understanding and agrees. Discussed above plan of care with Dr. Reynold Judd.

## 2017-01-25 NOTE — PROGRESS NOTES
Chief Complaint   Patient presents with    Facial Pain     right side of face 10/10 scale tender and painful    Chest Pain     pain with inhalation     Reviewed record  In preparation for visit and have obtained necessary documentation. 1. Have you been to the ER, urgent care clinic since your last visit? Hospitalized since your last visit? No    2. Have you seen or consulted any other health care providers outside of the [address] [place] [address] since your last visit? Include any pap smears or colon screening. Psych Dr. Wilmer Jack.     Used 2 patient I. D. 's

## 2017-02-21 ENCOUNTER — OFFICE VISIT (OUTPATIENT)
Dept: INTERNAL MEDICINE CLINIC | Age: 82
End: 2017-02-21

## 2017-02-21 VITALS
HEART RATE: 78 BPM | SYSTOLIC BLOOD PRESSURE: 154 MMHG | OXYGEN SATURATION: 99 % | BODY MASS INDEX: 21.97 KG/M2 | DIASTOLIC BLOOD PRESSURE: 84 MMHG | HEIGHT: 74 IN | WEIGHT: 171.2 LBS | RESPIRATION RATE: 20 BRPM | TEMPERATURE: 98.3 F

## 2017-02-21 DIAGNOSIS — G30.1 LATE ONSET ALZHEIMER'S DISEASE WITHOUT BEHAVIORAL DISTURBANCE (HCC): ICD-10-CM

## 2017-02-21 DIAGNOSIS — R19.7 DIARRHEA, UNSPECIFIED TYPE: Primary | ICD-10-CM

## 2017-02-21 DIAGNOSIS — E78.2 MIXED HYPERLIPIDEMIA: ICD-10-CM

## 2017-02-21 DIAGNOSIS — F02.80 LATE ONSET ALZHEIMER'S DISEASE WITHOUT BEHAVIORAL DISTURBANCE (HCC): ICD-10-CM

## 2017-02-21 DIAGNOSIS — F32.A DEPRESSION, UNSPECIFIED DEPRESSION TYPE: ICD-10-CM

## 2017-02-21 RX ORDER — RIVASTIGMINE 4.6 MG/24H
PATCH, EXTENDED RELEASE TRANSDERMAL
Qty: 30 PATCH | Refills: 1 | Status: SHIPPED | OUTPATIENT
Start: 2017-02-21 | End: 2017-04-07

## 2017-02-21 RX ORDER — ESCITALOPRAM OXALATE 10 MG/1
10 TABLET ORAL DAILY
Qty: 30 TAB | Refills: 3 | Status: SHIPPED | OUTPATIENT
Start: 2017-02-21 | End: 2017-09-19 | Stop reason: SDUPTHER

## 2017-02-21 NOTE — PROGRESS NOTES
HISTORY OF PRESENT ILLNESS  Katerina Wade is a 80 y.o. male  accompanied by her daughter and son in law. C/O loose stool every morning. he has one or 4 bouts of watery stool every morning for last couple of month. he has no appetite. Has recent abx for parotitis. no abd pain or fever. had C  Def and stool culture done 8 month back,all are normal.  Daughter thinks it is from [address] since it has started since he was started on it.  but his mood is stable and depression is under control. need lexapro refill. He has adv dementia. on meds. living in assisted living.memory is worse.has behaviour changes too. Has CAD. stable. all albs reviewed. .    Follow-up     Medication Problem     Diarrhea    Pertinent negatives include no chills. Dementia      Cholesterol Problem         Review of Systems   Constitutional: Negative. Negative for chills and fever. HENT: Negative. Respiratory: Negative. Gastrointestinal: Positive for diarrhea. Genitourinary: Negative. Musculoskeletal: Negative. Neurological: Negative. Psychiatric/Behavioral: Positive for memory loss. The patient is nervous/anxious. Physical Exam   Constitutional: He appears well-developed and well-nourished. No distress. Neck: Normal range of motion. Neck supple. No JVD present. No thyromegaly present. Cardiovascular: Normal rate, regular rhythm, normal heart sounds and intact distal pulses. Pulmonary/Chest: Effort normal and breath sounds normal. No respiratory distress. He has no wheezes. Abdominal: Soft. Bowel sounds are normal. He exhibits no distension. There is no tenderness. Psychiatric: He has a normal mood and affect. His behavior is normal.       ASSESSMENT and PLAN  Crow Carr was seen today for follow-up, medication problem, diarrhea and decreased appetite. Diagnoses and all orders for this visit:    Diarrhea, unspecified type    Had recent augmentin therapy.   Will check,  -     C DIFFICILE TOXIN GENE, ANGIE    Will D/C aricept too. Adv to have greek yogurt 8 oz every day. Late onset Alzheimer's disease without behavioral disturbance    Getting worse. Will D/C aricept and switch to,  -     rivastigmine (EXELON) 4.6 mg/24 hr patch; D/C aricept    Mixed hyperlipidemia    Stable. on statin. Depression, unspecified depression type    Stable. Will do,  -     escitalopram oxalate (LEXAPRO) 10 mg tablet; Take 1 Tab by mouth daily. Take 1/2 daily      Discussed expected course/resolution/complications of diagnosis in detail with patient. Medication risks/benefits/costs/interactions/alternatives discussed with patient. Pt was given an after visit summary which includes diagnoses, current medications & vitals. Pt expressed understanding with the diagnosis and plan.

## 2017-02-21 NOTE — MR AVS SNAPSHOT
Visit Information Date & Time Provider Department Dept. Phone Encounter #  
 2/21/2017 12:30 PM Tressa Hood MD [facility] Internal Medicine [facility] [phone] [id number] Upcoming Health Maintenance Date Due DTaP/Tdap/Td series (1 - Tdap) 2/3/1955 Pneumococcal 65+ Low/Medium Risk (1 of 2 - PCV13) 2/3/1999 INFLUENZA AGE 9 TO ADULT 8/1/2016 MEDICARE YEARLY EXAM 11/4/2016 GLAUCOMA SCREENING Q2Y 4/18/2018 Allergies as of 2/21/2017  Review Complete On: 2/21/2017 By: Tressa Hood MD  
  
 Severity Noted Reaction Type Reactions Chocolate [Cocoa]  12/16/2014    Sneezing Namenda [Memantine]  12/16/2014    Shortness of Breath Current Immunizations  Reviewed on 3/12/2015 Name Date Influenza Vaccine 11/1/2014 Zoster Vaccine, Live 12/23/2014 Not reviewed this visit You Were Diagnosed With   
  
 Codes Comments Diarrhea, unspecified type    -  Primary ICD-10-CM: R19.7 ICD-9-CM: 787.91 Late onset Alzheimer's disease without behavioral disturbance     ICD-10-CM: G30.1, F02.80 ICD-9-CM: 331.0, 294.10 Mixed hyperlipidemia     ICD-10-CM: E78.2 ICD-9-CM: 272.2 Vitals BP Pulse Temp Resp Height(growth percentile) Weight(growth percentile) 154/84 (BP 1 Location: Left arm, BP Patient Position: Sitting) 78 98.3 °F (36.8 °C) (Oral) 20 6' 2\" (1.88 m) 171 lb 3.2 oz (77.7 kg) SpO2 BMI Smoking Status 99% 21.98 kg/m2 Former Smoker Vitals History BMI and BSA Data Body Mass Index Body Surface Area  
 21.98 kg/m 2 2.01 m 2 Preferred Pharmacy Pharmacy Name Phone [facility] [phone] Your Updated Medication List  
  
   
This list is accurate as of: 2/21/17  1:05 PM.  Always use your most recent med list.  
  
  
  
  
 aspirin 325 mg tablet Commonly known as:  ASPIRIN Take 325 mg by mouth daily. atenolol 25 mg tablet Commonly known as:  TENORMIN Take 25 mg by mouth daily. donepezil 10 mg tablet Commonly known as:  ARICEPT Take 10 mg by mouth nightly. food supplemt, lactose-reduced Liqd Commonly known as:  ENSURE Take 237 mL by mouth daily. hydrOXYzine HCl 10 mg tablet Commonly known as:  ATARAX Take  by mouth three (3) times daily as needed for Itching. LEXAPRO 10 mg tablet Generic drug:  escitalopram oxalate Take 10 mg by mouth daily. Take 1/2 daily  
  
 ofloxacin 0.3 % ophthalmic solution Commonly known as:  FLOXIN Administer 3 Drops to both eyes four (4) times daily. prednisoLONE acetate 1 % ophthalmic suspension Commonly known as:  PRED FORTE Administer 1 Drop to both eyes four (4) times daily. rivastigmine 4.6 mg/24 hr patch Commonly known as:  EXELON  
D/C aricept  
  
 simvastatin 20 mg tablet Commonly known as:  ZOCOR  
TAKE 1 TABLET BY MOUTH NIGHTLY. STOP ZOCOR 40MG. therapeutic multivitamin tablet Commonly known as:  [facility] Take 1 Tab by mouth daily. Prescriptions Sent to Pharmacy Refills  
 rivastigmine (EXELON) 4.6 mg/24 hr patch 1 Sig: D/C aricept Class: Normal  
 Pharmacy: [address] [place] [address]  Ph #: [phone] We Performed the Following C DIFFICILE TOXIN GENE, ANGIE [[id number] Custom] Introducing [facility] SERVICES! Mellissa Álvarez introduces Talkray patient portal. Now you can access parts of your medical record, email your doctor's office, and request medication refills online. 1. In your internet browser, go to [email or web address] 2. Click on the First Time User? Click Here link in the Sign In box. You will see the New Member Sign Up page. 3. Enter your Talkray Access Code exactly as it appears below. You will not need to use this code after youve completed the sign-up process.  If you do not sign up before the expiration date, you must request a new code. · [employer] Access Code: [email or web address] Expires: 4/24/2017 12:24 PM 
 
4. Enter the last four digits of your Social Security Number (xxxx) and Date of Birth (mm/dd/yyyy) as indicated and click Submit. You will be taken to the next sign-up page. 5. Create a [employer] ID. This will be your [employer] login ID and cannot be changed, so think of one that is secure and easy to remember. 6. Create a [employer] password. You can change your password at any time. 7. Enter your Password Reset Question and Answer. This can be used at a later time if you forget your password. 8. Enter your e-mail address. You will receive e-mail notification when new information is available in [address] 9. Click Sign Up. You can now view and download portions of your medical record. 10. Click the Download Summary menu link to download a portable copy of your medical information. If you have questions, please visit the Frequently Asked Questions section of the [employer] website. Remember, [employer] is NOT to be used for urgent needs. For medical emergencies, dial 911. Now available from your iPhone and Android! Please provide this summary of care documentation to your next provider. Your primary care clinician is listed as Cami Macias. If you have any questions after today's visit, please call [phone].

## 2017-02-21 NOTE — PROGRESS NOTES
Health Maintenance Due   Topic Date Due    DTaP/Tdap/Td series (1 - Tdap) 02/03/1955    Pneumococcal 65+ Low/Medium Risk (1 of 2 - PCV13) 02/03/1999    INFLUENZA AGE 9 TO ADULT  08/01/2016    MEDICARE YEARLY EXAM  11/04/2016       Chief Complaint   Patient presents with    Follow-up     1 month       1. Have you been to the ER, urgent care clinic since your last visit? Hospitalized since your last visit? No    2. Have you seen or consulted any other health care providers outside of the [address] [place] [address] since your last visit? Include any pap smears or colon screening. No    3) Do you have an Advance Directive on file? no    4) Are you interested in receiving information on Advance Directives? NO      Patient is accompanied by self I have received verbal consent from [address] to discuss any/all medical information while they are present in the room.

## 2017-03-23 ENCOUNTER — OFFICE VISIT (OUTPATIENT)
Dept: INTERNAL MEDICINE CLINIC | Age: 82
End: 2017-03-23

## 2017-03-23 VITALS
WEIGHT: 170 LBS | DIASTOLIC BLOOD PRESSURE: 60 MMHG | BODY MASS INDEX: 21.82 KG/M2 | RESPIRATION RATE: 18 BRPM | HEIGHT: 74 IN | TEMPERATURE: 97.6 F | HEART RATE: 67 BPM | SYSTOLIC BLOOD PRESSURE: 116 MMHG | OXYGEN SATURATION: 97 %

## 2017-03-23 DIAGNOSIS — N39.0 URINARY TRACT INFECTION WITHOUT HEMATURIA, SITE UNSPECIFIED: ICD-10-CM

## 2017-03-23 DIAGNOSIS — F02.80 LATE ONSET ALZHEIMER'S DISEASE WITHOUT BEHAVIORAL DISTURBANCE (HCC): ICD-10-CM

## 2017-03-23 DIAGNOSIS — R32 URINARY INCONTINENCE, UNSPECIFIED TYPE: ICD-10-CM

## 2017-03-23 DIAGNOSIS — G30.1 LATE ONSET ALZHEIMER'S DISEASE WITHOUT BEHAVIORAL DISTURBANCE (HCC): ICD-10-CM

## 2017-03-23 DIAGNOSIS — R41.82 ALTERED MENTAL STATUS, UNSPECIFIED: Primary | ICD-10-CM

## 2017-03-23 LAB
BILIRUB UR QL STRIP: NEGATIVE
GLUCOSE UR-MCNC: NEGATIVE MG/DL
KETONES P FAST UR STRIP-MCNC: ABNORMAL MG/DL
PH UR STRIP: 5 [PH] (ref 4.6–8)
PROT UR QL STRIP: NEGATIVE MG/DL
SP GR UR STRIP: 1030 (ref 1–1.03)
UA UROBILINOGEN AMB POC: NORMAL (ref 0.2–1)
URINALYSIS CLARITY POC: CLEAR
URINALYSIS COLOR POC: YELLOW
URINE BLOOD POC: NEGATIVE
URINE LEUKOCYTES POC: ABNORMAL
URINE NITRITES POC: NEGATIVE

## 2017-03-23 RX ORDER — SULFAMETHOXAZOLE AND TRIMETHOPRIM 400; 80 MG/1; MG/1
1 TABLET ORAL 2 TIMES DAILY
Qty: 10 TAB | Refills: 0 | Status: SHIPPED | OUTPATIENT
Start: 2017-03-23 | End: 2017-03-28

## 2017-03-23 NOTE — MR AVS SNAPSHOT
Visit Information Date & Time Provider Department Dept. Phone Encounter #  
 3/23/2017  1:00 PM Kenton Christensen[address] [phone] [id number] Follow-up Instructions Return if symptoms worsen or fail to improve. Your Appointments 4/18/2017  9:15 AM  
ROUTINE CARE with Aguilar Mckya MD  
[address] ([facility]) Appt Note: coming at 10am for 5 week follow up; r/s for 5 week follow up [address] [place] [address] [phone] [id number] [phone]  
  
   
 [address] [place] [address]. [address] [place] [phone] Upcoming Health Maintenance Date Due DTaP/Tdap/Td series (1 - Tdap) 2/3/1955 Pneumococcal 65+ Low/Medium Risk (1 of 2 - PCV13) 2/3/1999 INFLUENZA AGE 9 TO ADULT 8/1/2016 MEDICARE YEARLY EXAM 11/4/2016 GLAUCOMA SCREENING Q2Y 4/18/2018 Allergies as of 3/23/2017  Review Complete On: 3/23/2017 By: Nan Motley, DO Severity Noted Reaction Type Reactions Chocolate [Cocoa]  12/16/2014    Sneezing Namenda [Memantine]  12/16/2014    Shortness of Breath Current Immunizations  Reviewed on 3/12/2015 Name Date Influenza Vaccine 11/1/2014 Zoster Vaccine, Live 12/23/2014 Not reviewed this visit You Were Diagnosed With   
  
 Codes Comments Altered mental status, unspecified    -  Primary ICD-10-CM: R41.82 
ICD-9-CM: 780.97 Urinary incontinence, unspecified type     ICD-10-CM: R32 
ICD-9-CM: 788.30 Urinary tract infection without hematuria, site unspecified     ICD-10-CM: N39.0 ICD-9-CM: 599.0 Late onset Alzheimer's disease without behavioral disturbance     ICD-10-CM: G30.1, F02.80 ICD-9-CM: 331.0, 294.10 Vitals BP Pulse Temp Resp Height(growth percentile) 116/60 (BP 1 Location: Left arm, BP Patient Position: Sitting) 67 97.6 °F (36.4 °C) (Temporal) 18 6' 2\" (1.88 m) Weight(growth percentile) SpO2 BMI Smoking Status 170 lb (77.1 kg) 97% 21.83 kg/m2 Former Smoker Vitals History BMI and BSA Data Body Mass Index Body Surface Area  
 21.83 kg/m 2 2.01 m 2 Preferred Pharmacy Pharmacy Name Phone [facility] [phone] Your Updated Medication List  
  
   
This list is accurate as of: 3/23/17  2:07 PM.  Always use your most recent med list.  
  
  
  
  
 aspirin 325 mg tablet Commonly known as:  ASPIRIN Take 325 mg by mouth daily. atenolol 25 mg tablet Commonly known as:  TENORMIN Take 25 mg by mouth daily. donepezil 10 mg tablet Commonly known as:  ARICEPT Take 10 mg by mouth nightly. escitalopram oxalate 10 mg tablet Commonly known as:  Pollyann Brookings Take 1 Tab by mouth daily. Take 1/2 daily  
  
 food supplemt, lactose-reduced Liqd Commonly known as:  ENSURE Take 237 mL by mouth daily. hydrOXYzine HCl 10 mg tablet Commonly known as:  ATARAX Take  by mouth three (3) times daily as needed for Itching. ofloxacin 0.3 % ophthalmic solution Commonly known as:  FLOXIN Administer 3 Drops to both eyes four (4) times daily. prednisoLONE acetate 1 % ophthalmic suspension Commonly known as:  PRED FORTE Administer 1 Drop to both eyes four (4) times daily. rivastigmine 4.6 mg/24 hr patch Commonly known as:  EXELON  
D/C aricept  
  
 simvastatin 20 mg tablet Commonly known as:  ZOCOR  
TAKE 1 TABLET BY MOUTH NIGHTLY. STOP ZOCOR 40MG. therapeutic multivitamin tablet Commonly known as:  [facility] Take 1 Tab by mouth daily. trimethoprim-sulfamethoxazole  mg per tablet Commonly known as:  Anna Fuel Take 1 Tab by mouth two (2) times a day for 5 days. Prescriptions Sent to Pharmacy  Refills  
 trimethoprim-sulfamethoxazole (BACTRIM, SEPTRA)  mg per tablet 0  
 Sig: Take 1 Tab by mouth two (2) times a day for 5 days. Class: Normal  
 Pharmacy: [address]  #: [phone] Route: Oral  
  
We Performed the Following AMB POC URINALYSIS DIP STICK MANUAL W/ MICRO [58324 CPT(R)] Follow-up Instructions Return if symptoms worsen or fail to improve. Introducing \[facility]\"" & HEALTH SERVICES! [employer] introduces O[employer] patient portal. Now you can access parts of your medical record, email your doctor's office, and request medication refills online. 1. In your internet browser, go to [email or web address] 2. Click on the First Time User? Click Here link in the Sign In box. You will see the New Member Sign Up page. 3. Enter your [employer] Access Code exactly as it appears below. You will not need to use this code after youve completed the sign-up process. If you do not sign up before the expiration date, you must request a new code. · [employer] Access Code: [email or web address] Expires: 4/24/2017  1:24 PM 
 
4. Enter the last four digits of your Social Security Number (xxxx) and Date of Birth (mm/dd/yyyy) as indicated and click Submit. You will be taken to the next sign-up page. 5. Create a O[employer] ID. This will be your O[employer] login ID and cannot be changed, so think of one that is secure and easy to remember. 6. Create a O[employer] password. You can change your password at any time. 7. Enter your Password Reset Question and Answer. This can be used at a later time if you forget your password. 8. Enter your e-mail address. You will receive e-mail notification when new information is available in [address] 9. Click Sign Up. You can now view and download portions of your medical record. 10. Click the Download Summary menu link to download a portable copy of your medical information.  
 
If you have questions, please visit the Frequently Asked Questions section of the [employer]. Remember, [employer] is NOT to be used for urgent needs. For medical emergencies, dial 911. Now available from your iPhone and Android! Please provide this summary of care documentation to your next provider. Your primary care clinician is listed as Elayne Soulier. If you have any questions after today's visit, please call [phone].

## 2017-03-23 NOTE — PROGRESS NOTES
Reviewed record in preparation for visit and have obtained necessary documentation. Identified pt with two pt identifiers(name and ). Health Maintenance Due   Topic    DTaP/Tdap/Td series (1 - Tdap)    Pneumococcal 65+ Low/Medium Risk (1 of 2 - PCV13)    INFLUENZA AGE 9 TO ADULT     MEDICARE YEARLY EXAM          Chief Complaint   Patient presents with    Altered mental status     r/o uti          Learning Assessment:  :     Learning Assessment 2015   PRIMARY LEARNER Patient   HIGHEST LEVEL OF EDUCATION - PRIMARY LEARNER  4 YEARS OF COLLEGE   BARRIERS PRIMARY LEARNER COGNITIVE   CO-LEARNER CAREGIVER Yes   PRIMARY LANGUAGE ENGLISH   LEARNER PREFERENCE PRIMARY READING   ANSWERED BY patient   RELATIONSHIP SELF       Depression Screening:  :     PHQ 2 / 9, over the last two weeks 2014   Little interest or pleasure in doing things Several days   Feeling down, depressed or hopeless Several days   Total Score PHQ 2 2       Fall Risk Assessment:  :     Fall Risk Assessment, last 12 mths 2017   Able to walk? Yes   Fall in past 12 months? No       Abuse Screening:  :     Abuse Screening Questionnaire 3/24/2015   Do you ever feel afraid of your partner? N   Are you in a relationship with someone who physically or mentally threatens you? N   Is it safe for you to go home? Y       Coordination of Care Questionnaire:  :     1) Have you been to an emergency room, urgent care clinic since your last visit? no  Hospitalized since your last visit? no             2) Have you seen or consulted any other health care providers outside of [address] [place] [address] since your last visit? no  (Include any pap smears or colon screenings in this section.)    3) Do you have an Advance Directive on file? no    4) Are you interested in receiving information on Advance Directives?  NO      Patient is accompanied by self I have received verbal consent from [facility] to discuss any/all medical information while they are present in the room.

## 2017-03-31 NOTE — PROGRESS NOTES
HISTORY OF PRESENT ILLNESS  Salvatore Rg is a 80 y.o. male. Pt comes in for f/u. CC and medical problems reviewed and discussed with pt as outlined below. Poor historian with dementia. According to care giver has been more confused. Having increased urine incontinence with bed wetting. Pt denies any problems and asking why he is here. PMH, Allergies, Social Hx, Updated Med list, and Most recent available labs/studies reviewed and discussed with pt. Reports compliance with medications and diet. Reports no other new c/o. Altered mental status      Dementia          Review of Systems   Constitutional: Negative. HENT: Negative. Eyes: Negative. Negative for blurred vision. Respiratory: Negative. Negative for shortness of breath. Cardiovascular: Negative for chest pain and leg swelling. Gastrointestinal: Negative for abdominal pain. Genitourinary: Positive for frequency and urgency. Negative for dysuria, flank pain and hematuria. Musculoskeletal: Negative. Skin: Negative. Neurological: Negative for dizziness and focal weakness. Endo/Heme/Allergies: Negative. Psychiatric/Behavioral: Positive for memory loss. Negative for depression. Physical Exam   Constitutional: He appears well-developed and well-nourished. No distress. HENT:   Head: Normocephalic and atraumatic. Mouth/Throat: No oropharyngeal exudate. Eyes: No scleral icterus. Neck: Normal range of motion. Neck supple. No JVD present. No thyromegaly present. Cardiovascular: Normal rate, regular rhythm, normal heart sounds and intact distal pulses. No murmur heard. Pulmonary/Chest: Effort normal and breath sounds normal. No respiratory distress. He has no wheezes. He has no rales. Abdominal: He exhibits no distension. There is no tenderness. Musculoskeletal: He exhibits no edema or tenderness. Neurological: He is alert.  Coordination normal.   A+O x 2  Confused  Keeps repeating old stories   Skin: No rash noted.   Psychiatric: He has a normal mood and affect. His behavior is normal.       ASSESSMENT and PLAN  Ismael Dawson was seen today for altered mental status and dementia. Diagnoses and all orders for this visit:    Altered mental status, unspecified  -     AMB POC URINALYSIS DIP STICK MANUAL W/ MICRO    Urinary incontinence, unspecified type    Urinary tract infection without hematuria, site unspecified    Late onset Alzheimer's disease without behavioral disturbance    Other orders  -     trimethoprim-sulfamethoxazole (BACTRIM, SEPTRA)  mg per tablet; Take 1 Tab by mouth two (2) times a day for 5 days.       Follow-up Disposition:  Return if symptoms worsen or fail to improve.   lab results and schedule of future lab studies reviewed with patient  reviewed diet, exercise and weight control  reviewed medications and side effects in detail  I called his daughter/POA and discussed my findings and plan  Advised to look into AL for pt

## 2017-04-07 ENCOUNTER — OFFICE VISIT (OUTPATIENT)
Dept: INTERNAL MEDICINE CLINIC | Age: 82
End: 2017-04-07

## 2017-04-07 ENCOUNTER — HOSPITAL ENCOUNTER (OUTPATIENT)
Dept: LAB | Age: 82
Discharge: HOME | End: 2017-04-07
Payer: MEDICARE

## 2017-04-07 VITALS
HEIGHT: 74 IN | BODY MASS INDEX: 21.66 KG/M2 | SYSTOLIC BLOOD PRESSURE: 124 MMHG | OXYGEN SATURATION: 96 % | RESPIRATION RATE: 18 BRPM | WEIGHT: 168.8 LBS | TEMPERATURE: 97.6 F | DIASTOLIC BLOOD PRESSURE: 61 MMHG | HEART RATE: 67 BPM

## 2017-04-07 DIAGNOSIS — I25.10 CORONARY ARTERY DISEASE DUE TO LIPID RICH PLAQUE: ICD-10-CM

## 2017-04-07 DIAGNOSIS — R41.0 CONFUSION: ICD-10-CM

## 2017-04-07 DIAGNOSIS — F03.90 DEMENTIA WITHOUT BEHAVIORAL DISTURBANCE, UNSPECIFIED DEMENTIA TYPE: Primary | ICD-10-CM

## 2017-04-07 DIAGNOSIS — I25.83 CORONARY ARTERY DISEASE DUE TO LIPID RICH PLAQUE: ICD-10-CM

## 2017-04-07 DIAGNOSIS — R73.09 LOW GLUCOSE LEVEL: ICD-10-CM

## 2017-04-07 DIAGNOSIS — F32.A DEPRESSION, UNSPECIFIED DEPRESSION TYPE: ICD-10-CM

## 2017-04-07 LAB
BILIRUB UR QL STRIP: NEGATIVE
GLUCOSE UR-MCNC: NEGATIVE MG/DL
KETONES P FAST UR STRIP-MCNC: NEGATIVE MG/DL
PH UR STRIP: 5.5 [PH] (ref 4.6–8)
PROT UR QL STRIP: NORMAL MG/DL
SP GR UR STRIP: 1.03 (ref 1–1.03)
UA UROBILINOGEN AMB POC: NORMAL (ref 0.2–1)
URINALYSIS CLARITY POC: NORMAL
URINALYSIS COLOR POC: NORMAL
URINE BLOOD POC: NEGATIVE
URINE LEUKOCYTES POC: NEGATIVE
URINE NITRITES POC: NEGATIVE

## 2017-04-07 PROCEDURE — 87086 URINE CULTURE/COLONY COUNT: CPT

## 2017-04-07 PROCEDURE — 85025 COMPLETE CBC W/AUTO DIFF WBC: CPT

## 2017-04-07 PROCEDURE — 84443 ASSAY THYROID STIM HORMONE: CPT

## 2017-04-07 PROCEDURE — 82607 VITAMIN B-12: CPT

## 2017-04-07 PROCEDURE — 83036 HEMOGLOBIN GLYCOSYLATED A1C: CPT

## 2017-04-07 PROCEDURE — 80053 COMPREHEN METABOLIC PANEL: CPT

## 2017-04-07 PROCEDURE — 36415 COLL VENOUS BLD VENIPUNCTURE: CPT

## 2017-04-07 RX ORDER — DONEPEZIL HYDROCHLORIDE 5 MG/1
5 TABLET, FILM COATED ORAL
Qty: 30 TAB | Refills: 1 | Status: SHIPPED | OUTPATIENT
Start: 2017-04-07 | End: 2017-05-04 | Stop reason: SDUPTHER

## 2017-04-07 NOTE — PROGRESS NOTES
HISTORY OF PRESENT ILLNESS  Paulo Pearson is a 80 y.o. male. This is a patient of Dr. Marciano Malik who presents today with his daughter and son-in-law. The patient's past medical history includes dementia. Per the patient's daughter, the patient has had increased confusion over the last 6 weeks or so. She has noted some loss of function in ability to perform own ADLs. She mentions that this morning the patient had a shopping bag on his foot instead of a shoe. The patient's daughter has noted that at times symptoms seem to improve after a meal.  She has been concerned about weight loss and has therefore, set up for care aids to come to the patient's apartment and provide meals twice daily. She has also noted some disorientation related to night vs day. Per the patient's daughter, his medications were adjusted by Dr. Marciano Malik at visit in February 2017. She noted a change in mentation after this visit, when Aricept was discontinued and Exelon patch was started. The dose of Lexapro was also decreased at February appt related to symptom of diarrhea. Diarrhea resolved after this dose adjustment. The patient is alert and oriented to self and [address] at time of visit. He denies pain. Visit Vitals    /61 (BP 1 Location: Left arm, BP Patient Position: Sitting)    Pulse 67    Temp 97.6 °F (36.4 °C) (Oral)    Resp 18    Ht 6' 2.02\" (1.88 m)    Wt 168 lb 12.8 oz (76.6 kg)    SpO2 96%    BMI 21.66 kg/m2     HPI    Review of Systems   Constitutional: Negative for fever. Respiratory: Negative for cough and shortness of breath. Cardiovascular: Negative for chest pain and leg swelling. Gastrointestinal: Negative for abdominal pain. Musculoskeletal: Negative. Skin: Negative. Neurological: Negative for headaches. Physical Exam   Constitutional: He appears well-developed. No distress. HENT:   Head: Normocephalic and atraumatic.    Eyes: Conjunctivae and EOM are normal. Pupils are equal, round, and reactive to light. Neck: Neck supple. Cardiovascular: Normal rate and regular rhythm. Pulmonary/Chest: Effort normal and breath sounds normal. No respiratory distress. He has no wheezes. He has no rales. Abdominal: Soft. Bowel sounds are normal. He exhibits no distension. There is no tenderness. Musculoskeletal: He exhibits no edema. Neurological: He is alert. No cranial nerve deficit. He exhibits normal muscle tone. Coordination normal.   Oriented to self and [place], [place]  Disoriented to time  Answering simple questions appropriately  Also noted with some disorganized speech   Skin: Skin is warm and dry. Psychiatric:   MMSE   Nursing note and vitals reviewed. ASSESSMENT and PLAN    ICD-10-CM ICD-9-CM    1. Dementia without behavioral disturbance, unspecified dementia type F03.90 294.20 Will stop Exelon patch. Will order:  donepezil (ARICEPT) 5 mg tablet, 1 tab po daily. Will order  CBC WITH AUTOMATED DIFF      METABOLIC PANEL, COMPREHENSIVE      TSH 3RD GENERATION      VITAMIN B12   2. Confusion R41.0 298.9 In office:  AMB POC URINALYSIS DIP STICK AUTO W/ MICRO- negative   Will order CULTURE, URINE   3. Low glucose level E16.2 251.2 Will order  HEMOGLOBIN A1C WITH EAG   4. Depression, unspecified depression type F32.9 311 Continue Lexapro 5 mg daily at this time. 5. Coronary artery disease due to lipid rich plaque I25.10 414.00 On aspirin 325 mg po daily    I25.83 414.3      Discussed with patient's daughter recommendation for increased care and assistance with ADLs. Consider increased time of health care aides and/or assisted living environment to promote safety. Patient's daughter states she will discuss needs with Joi.     Lab results and schedule of future lab studies reviewed with patient  Reviewed diet, exercise and weight control  Reviewed medications and side effects in detail  Patient encouraged to call or return to office if symptoms do not improve or worsen. Reviewed plan of care with patient who acknowledges understanding and agrees. Follow-up with Dr. Ross Cardoza in 2 weeks, as scheduled.

## 2017-04-07 NOTE — MR AVS SNAPSHOT
Visit Information Date & Time Provider Department Dept. Phone Encounter #  
 4/7/2017 11:00 AM Flor Phillips, [address] [facility] Internal Medicine [phone] [id number] Your Appointments 4/18/2017  9:15 AM  
ROUTINE CARE with MD Nga Patel ([facility]) Appt Note: coming at 10am for 5 week follow up; r/s for 5 week follow up [address] [place] [id number] [phone]  
  
   
 [address] [place] [address]. [address] [place] [phone] Upcoming Health Maintenance Date Due DTaP/Tdap/Td series (1 - Tdap) 2/3/1955 Pneumococcal 65+ Low/Medium Risk (1 of 2 - PCV13) 2/3/1999 INFLUENZA AGE 9 TO ADULT 8/1/2016 MEDICARE YEARLY EXAM 11/4/2016 GLAUCOMA SCREENING Q2Y 4/18/2018 Allergies as of 4/7/2017  Review Complete On: 4/7/2017 By: Flor Phillips NP Severity Noted Reaction Type Reactions Chocolate [Cocoa]  12/16/2014    Sneezing No longer allergic Namenda [Memantine]  12/16/2014    Shortness of Breath Current Immunizations  Reviewed on 3/12/2015 Name Date Influenza Vaccine 11/1/2014 Zoster Vaccine, Live 12/23/2014 Not reviewed this visit You Were Diagnosed With   
  
 Codes Comments Confusion    -  Primary ICD-10-CM: R41.0 ICD-9-CM: 298.9 Late onset Alzheimer's disease without behavioral disturbance     ICD-10-CM: G30.1, F02.80 ICD-9-CM: 331.0, 294.10 Vitals BP Pulse Temp Resp Height(growth percentile) Weight(growth percentile) 124/61 (BP 1 Location: Left arm, BP Patient Position: Sitting) 67 97.6 °F (36.4 °C) (Oral) 18 6' 2.02\" (1.88 m) 168 lb 12.8 oz (76.6 kg) SpO2 BMI Smoking Status 96% 21.66 kg/m2 Former Smoker BMI and BSA Data Body Mass Index Body Surface Area  
 21.66 kg/m 2 2 m 2 Preferred Pharmacy Pharmacy Name Phone [phone] [phone] Your Updated Medication List  
  
   
This list is accurate as of: 4/7/17 12:01 PM.  Always use your most recent med list.  
  
  
  
  
 aspirin 325 mg tablet Commonly known as:  ASPIRIN Take 325 mg by mouth daily. atenolol 25 mg tablet Commonly known as:  TENORMIN Take 25 mg by mouth daily. donepezil 5 mg tablet Commonly known as:  ARICEPT Take 1 Tab by mouth nightly. escitalopram oxalate 10 mg tablet Commonly known as:  Grazyna Larger Take 1 Tab by mouth daily. Take 1/2 daily  
  
 food supplemt, lactose-reduced Liqd Commonly known as:  ENSURE Take 237 mL by mouth daily. hydrOXYzine HCl 10 mg tablet Commonly known as:  ATARAX Take  by mouth three (3) times daily as needed for Itching. ofloxacin 0.3 % ophthalmic solution Commonly known as:  FLOXIN Administer 3 Drops to both eyes four (4) times daily. prednisoLONE acetate 1 % ophthalmic suspension Commonly known as:  PRED FORTE Administer 1 Drop to both eyes four (4) times daily. simvastatin 20 mg tablet Commonly known as:  ZOCOR  
TAKE 1 TABLET BY MOUTH NIGHTLY. STOP ZOCOR 40MG. therapeutic multivitamin tablet Commonly known as:  [facility] Take 1 Tab by mouth daily. Prescriptions Sent to Pharmacy Refills  
 donepezil (ARICEPT) 5 mg tablet 1 Sig: Take 1 Tab by mouth nightly. Class: Normal  
 Pharmacy: [address]  #: [phone] Route: Oral  
  
We Performed the Following AMB POC URINALYSIS DIP STICK AUTO W/ MICRO [29416 CPT(R)] CBC WITH AUTOMATED DIFF [94926 CPT(R)] METABOLIC PANEL, COMPREHENSIVE [39122 CPT(R)] TSH 3RD GENERATION [92813 CPT(R)] VITAMIN B12 [id number] CPT(R)] Patient Instructions Learning About the Symptoms of Dementia What is dementia? We all forget things as we get older.  Many older people have a slight loss of memory that does not affect their daily lives. But memory loss that gets worse may mean that you have dementia. Dementia is a loss of mental skills that affects your daily life. It can cause problems with memory, problem-solving, and learning. It also can cause problems with thinking and planning. Dementia usually gets worse over time. But how quickly it gets worse is different for each person. Some people stay the same for years. Others lose skills quickly. Your chances of having dementia rise as you get older. But this doesn't mean that everyone will get it. What causes dementia? Dementia is caused by damage to or changes in the brain. Alzheimer's disease is the most common kind of dementia. Alzheimer's causes a steady loss of memory and how well you can speak, think, and do your daily activities. The second most common kind of dementia is caused by a series of strokes. It's called vascular dementia. It often gets worse step by step. With each new stroke, more mental skills are lost. 
Serious head injuries in the past can sometimes lead to dementia, too. What are the symptoms? Usually the first symptom of dementia is memory loss. Often the person who has the memory problem doesn't notice it, but family and friends do. People who have dementia may have increasing trouble with: 
· Recalling recent events. They may forget appointments or lose objects. · Recognizing people and places. · Keeping up with conversations and activity. · Finding their way around familiar places, or driving to and from places they know well. · Keeping up personal care such as grooming or bathing. · Planning and carrying out routine tasks. They may have trouble following a recipe or writing a letter or email. What are some next steps? If you are worried about memory loss, see your doctor soon.  He or she can do a physical exam and ask questions about recent and past illnesses and life events. Think about bringing someone to your doctor's appointment to take notes for you. Your doctor may suggest a series of tests to measure memory changes over time. These tests give the doctor an overall picture of how well your brain is working. The doctor can use the results to decide the best treatment program and help make your life safer and easier. It is important to know that memory loss can be caused by things other than dementia. These things can include health problems such as depression, a low amount of thyroid hormone, and some infections. When those things are treated, your ability to remember can get better. Follow-up care is a key part of your treatment and safety. Be sure to make and go to all appointments, and call your doctor if you are having problems. It's also a good idea to know your test results and keep a list of the medicines you take. Where can you learn more? Go to http://kathe-millicent.info/. Enter [id number] in the search box to learn more about \"Learning About the Symptoms of Dementia. \" Current as of: December 30, 2016 Content Version: 11.2 © 4973-1024 [employer]. Care instructions adapted under license by [employer] (which disclaims liability or warranty for this information). If you have questions about a medical condition or this instruction, always ask your healthcare professional. [address] any warranty or liability for your use of this information. Introducing [facility]! Sasha Angelica introduces [employer] patient portal. Now you can access parts of your medical record, email your doctor's office, and request medication refills online. 1. In your internet browser, go to [email or web address] 2. Click on the First Time User? Click Here link in the Sign In box. You will see the New Member Sign Up page. 3. Enter your [employer] Access Code exactly as it appears below.  You will not need to use this code after youve completed the sign-up process. If you do not sign up before the expiration date, you must request a new code. · [employer] Access Code: [email or web address] Expires: 4/24/2017  1:24 PM 
 
4. Enter the last four digits of your Social Security Number (xxxx) and Date of Birth (mm/dd/yyyy) as indicated and click Submit. You will be taken to the next sign-up page. 5. Create a [employer] ID. This will be your [employer] login ID and cannot be changed, so think of one that is secure and easy to remember. 6. Create a [employer] password. You can change your password at any time. 7. Enter your Password Reset Question and Answer. This can be used at a later time if you forget your password. 8. Enter your e-mail address. You will receive e-mail notification when new information is available in [address]. 9. Click Sign Up. You can now view and download portions of your medical record. 10. Click the Download Summary menu link to download a portable copy of your medical information. If you have questions, please visit the Frequently Asked Questions section of the [employer] website. Remember, [employer] is NOT to be used for urgent needs. For medical emergencies, dial 911. Now available from your iPhone and Android! Please provide this summary of care documentation to your next provider. Your primary care clinician is listed as Niurka Do. If you have any questions after today's visit, please call [phone].

## 2017-04-07 NOTE — PATIENT INSTRUCTIONS
Learning About the Symptoms of Dementia  What is dementia? We all forget things as we get older. Many older people have a slight loss of memory that does not affect their daily lives. But memory loss that gets worse may mean that you have dementia. Dementia is a loss of mental skills that affects your daily life. It can cause problems with memory, problem-solving, and learning. It also can cause problems with thinking and planning. Dementia usually gets worse over time. But how quickly it gets worse is different for each person. Some people stay the same for years. Others lose skills quickly. Your chances of having dementia rise as you get older. But this doesn't mean that everyone will get it. What causes dementia? Dementia is caused by damage to or changes in the brain. Alzheimer's disease is the most common kind of dementia. Alzheimer's causes a steady loss of memory and how well you can speak, think, and do your daily activities. The second most common kind of dementia is caused by a series of strokes. It's called vascular dementia. It often gets worse step by step. With each new stroke, more mental skills are lost.  Serious head injuries in the past can sometimes lead to dementia, too. What are the symptoms? Usually the first symptom of dementia is memory loss. Often the person who has the memory problem doesn't notice it, but family and friends do. People who have dementia may have increasing trouble with:  · Recalling recent events. They may forget appointments or lose objects. · Recognizing people and places. · Keeping up with conversations and activity. · Finding their way around familiar places, or driving to and from places they know well. · Keeping up personal care such as grooming or bathing. · Planning and carrying out routine tasks. They may have trouble following a recipe or writing a letter or email. What are some next steps?   If you are worried about memory loss, see your doctor soon. He or she can do a physical exam and ask questions about recent and past illnesses and life events. Think about bringing someone to your doctor's appointment to take notes for you. Your doctor may suggest a series of tests to measure memory changes over time. These tests give the doctor an overall picture of how well your brain is working. The doctor can use the results to decide the best treatment program and help make your life safer and easier. It is important to know that memory loss can be caused by things other than dementia. These things can include health problems such as depression, a low amount of thyroid hormone, and some infections. When those things are treated, your ability to remember can get better. Follow-up care is a key part of your treatment and safety. Be sure to make and go to all appointments, and call your doctor if you are having problems. It's also a good idea to know your test results and keep a list of the medicines you take. Where can you learn more? Go to http://kathe-millicent.info/. Enter [id number] in the search box to learn more about \"Learning About the Symptoms of Dementia. \"  Current as of: December 30, 2016  Content Version: 11.2  © 3668-1609 [employer]. Care instructions adapted under license by [employer] (which disclaims liability or warranty for this information). If you have questions about a medical condition or this instruction, always ask your healthcare professional. [place] [address] any warranty or liability for your use of this information.

## 2017-04-08 LAB
ALBUMIN SERPL-MCNC: 4.1 G/DL (ref 3.5–4.7)
ALBUMIN/GLOB SERPL: 1.6 {RATIO} (ref 1.2–2.2)
ALP SERPL-CCNC: 56 IU/L (ref 39–117)
ALT SERPL-CCNC: 13 IU/L (ref 0–44)
AST SERPL-CCNC: 18 IU/L (ref 0–40)
BACTERIA UR CULT: NO GROWTH
BASOPHILS # BLD AUTO: 0 X10E3/UL (ref 0–0.2)
BASOPHILS NFR BLD AUTO: 1 %
BILIRUB SERPL-MCNC: 0.8 MG/DL (ref 0–1.2)
BUN SERPL-MCNC: 31 MG/DL (ref 8–27)
BUN/CREAT SERPL: 29 (ref 10–24)
CALCIUM SERPL-MCNC: 9.1 MG/DL (ref 8.6–10.2)
CHLORIDE SERPL-SCNC: 102 MMOL/L (ref 96–106)
CO2 SERPL-SCNC: 26 MMOL/L (ref 18–29)
CREAT SERPL-MCNC: 1.07 MG/DL (ref 0.76–1.27)
EOSINOPHIL # BLD AUTO: 0.1 X10E3/UL (ref 0–0.4)
EOSINOPHIL NFR BLD AUTO: 1 %
ERYTHROCYTE [DISTWIDTH] IN BLOOD BY AUTOMATED COUNT: 13.9 % (ref 12.3–15.4)
EST. AVERAGE GLUCOSE BLD GHB EST-MCNC: 120 MG/DL
GLOBULIN SER CALC-MCNC: 2.5 G/DL (ref 1.5–4.5)
GLUCOSE SERPL-MCNC: 98 MG/DL (ref 65–99)
HBA1C MFR BLD: 5.8 % (ref 4.8–5.6)
HCT VFR BLD AUTO: 40.6 % (ref 37.5–51)
HGB BLD-MCNC: 13.2 G/DL (ref 12.6–17.7)
IMM GRANULOCYTES # BLD: 0 X10E3/UL (ref 0–0.1)
IMM GRANULOCYTES NFR BLD: 0 %
LYMPHOCYTES # BLD AUTO: 2 X10E3/UL (ref 0.7–3.1)
LYMPHOCYTES NFR BLD AUTO: 23 %
MCH RBC QN AUTO: 31.1 PG (ref 26.6–33)
MCHC RBC AUTO-ENTMCNC: 32.5 G/DL (ref 31.5–35.7)
MCV RBC AUTO: 96 FL (ref 79–97)
MONOCYTES # BLD AUTO: 0.7 X10E3/UL (ref 0.1–0.9)
MONOCYTES NFR BLD AUTO: 9 %
NEUTROPHILS # BLD AUTO: 5.7 X10E3/UL (ref 1.4–7)
NEUTROPHILS NFR BLD AUTO: 66 %
PLATELET # BLD AUTO: 210 X10E3/UL (ref 150–379)
POTASSIUM SERPL-SCNC: 4.5 MMOL/L (ref 3.5–5.2)
PROT SERPL-MCNC: 6.6 G/DL (ref 6–8.5)
RBC # BLD AUTO: 4.25 X10E6/UL (ref 4.14–5.8)
SODIUM SERPL-SCNC: 142 MMOL/L (ref 134–144)
TSH SERPL DL<=0.005 MIU/L-ACNC: 1.06 UIU/ML (ref 0.45–4.5)
VIT B12 SERPL-MCNC: 976 PG/ML (ref 211–946)
WBC # BLD AUTO: 8.6 X10E3/UL (ref 3.4–10.8)

## 2017-04-08 NOTE — PROGRESS NOTES
BUN mildly elevated. PO fluids as tolerated. HgbA1c is 5.8. This is within pre-diabetic range. Encourage regular meals and snacks, low in sugars.   Otherwise, stable labs

## 2017-04-18 ENCOUNTER — OFFICE VISIT (OUTPATIENT)
Dept: INTERNAL MEDICINE CLINIC | Age: 82
End: 2017-04-18

## 2017-04-18 VITALS
RESPIRATION RATE: 18 BRPM | OXYGEN SATURATION: 98 % | HEART RATE: 66 BPM | HEIGHT: 74 IN | WEIGHT: 171 LBS | BODY MASS INDEX: 21.94 KG/M2 | SYSTOLIC BLOOD PRESSURE: 108 MMHG | DIASTOLIC BLOOD PRESSURE: 60 MMHG

## 2017-04-18 DIAGNOSIS — F03.91 DEMENTIA WITH BEHAVIORAL DISTURBANCE, UNSPECIFIED DEMENTIA TYPE: Primary | ICD-10-CM

## 2017-04-18 DIAGNOSIS — F41.9 ANXIETY: ICD-10-CM

## 2017-04-18 DIAGNOSIS — F22 PARANOIA (HCC): ICD-10-CM

## 2017-04-18 RX ORDER — ALPRAZOLAM 0.25 MG/1
0.25 TABLET ORAL DAILY
Qty: 30 TAB | Refills: 3 | Status: SHIPPED | OUTPATIENT
Start: 2017-04-18 | End: 2017-05-03 | Stop reason: DRUGHIGH

## 2017-04-18 RX ORDER — QUETIAPINE FUMARATE 25 MG/1
25 TABLET, FILM COATED ORAL
Qty: 30 TAB | Refills: 3 | Status: SHIPPED | OUTPATIENT
Start: 2017-04-18 | End: 2017-08-14 | Stop reason: SDUPTHER

## 2017-04-18 NOTE — PROGRESS NOTES
Reviewed record in preparation for visit and have obtained necessary documentation. Identified pt with two pt identifiers(name and ). Health Maintenance Due   Topic    DTaP/Tdap/Td series (1 - Tdap)    Pneumococcal 65+ Low/Medium Risk (1 of 2 - PCV13)    INFLUENZA AGE 9 TO ADULT     MEDICARE YEARLY EXAM          Chief Complaint   Patient presents with    Dementia    Anxiety          Learning Assessment:  :     Learning Assessment 2015   PRIMARY LEARNER Patient   HIGHEST LEVEL OF EDUCATION - PRIMARY LEARNER  4 YEARS OF COLLEGE   BARRIERS PRIMARY LEARNER COGNITIVE   CO-LEARNER CAREGIVER Yes   PRIMARY LANGUAGE ENGLISH   LEARNER PREFERENCE PRIMARY READING   ANSWERED BY patient   RELATIONSHIP SELF       Depression Screening:  :     PHQ  / 9, over the last two weeks 2014   Little interest or pleasure in doing things Several days   Feeling down, depressed or hopeless Several days   Total Score PHQ 2 2       Fall Risk Assessment:  :     Fall Risk Assessment, last 12 mths 2017   Able to walk? Yes   Fall in past 12 months? No       Abuse Screening:  :     Abuse Screening Questionnaire 3/24/2015   Do you ever feel afraid of your partner? N   Are you in a relationship with someone who physically or mentally threatens you? N   Is it safe for you to go home? Y       Coordination of Care Questionnaire:  :     1) Have you been to an emergency room, urgent care clinic since your last visit? yno  Hospitalized since your last visit? no             2) Have you seen or consulted any other health care providers outside of [address] [place] [address] since your last visit? no  (Include any pap smears or colon screenings in this section.)    3) Do you have an Advance Directive on file? no    4) Are you interested in receiving information on Advance Directives?  YES      Patient is accompanied by self and daughter I have received verbal consent from [facility] to discuss any/all medical information while they are present in the room.

## 2017-04-18 NOTE — MR AVS SNAPSHOT
Visit Information Date & Time Provider Department Dept. Phone Encounter #  
 4/18/2017  9:15 AM Horace Godoy MD [facility] [phone] [id number] Upcoming Health Maintenance Date Due DTaP/Tdap/Td series (1 - Tdap) 2/3/1955 Pneumococcal 65+ Low/Medium Risk (1 of 2 - PCV13) 2/3/1999 INFLUENZA AGE 9 TO ADULT 8/1/2016 MEDICARE YEARLY EXAM 11/4/2016 GLAUCOMA SCREENING Q2Y 4/18/2018 Allergies as of 4/18/2017  Review Complete On: 4/18/2017 By: Horace Godoy MD  
  
 Severity Noted Reaction Type Reactions Chocolate [Cocoa]  12/16/2014    Sneezing No longer allergic Namenda [Memantine]  12/16/2014    Shortness of Breath Current Immunizations  Reviewed on 3/12/2015 Name Date Influenza Vaccine 11/1/2014 Zoster Vaccine, Live 12/23/2014 Not reviewed this visit You Were Diagnosed With   
  
 Codes Comments Dementia with behavioral disturbance, unspecified dementia type    -  Primary ICD-10-CM: F03.91 
ICD-9-CM: 294.21 Paranoia ([address]     ICD-10-CM: F22 
ICD-9-CM: 297.1 Anxiety     ICD-10-CM: F41.9 ICD-9-CM: 300.00 Vitals BP Pulse Resp Height(growth percentile) Weight(growth percentile) SpO2  
 108/60 (BP 1 Location: Right arm, BP Patient Position: Sitting) 66 18 6' 2.02\" (1.88 m) 171 lb (77.6 kg) 98% BMI Smoking Status 21.94 kg/m2 Former Smoker BMI and BSA Data Body Mass Index Body Surface Area  
 21.94 kg/m 2 2.01 m 2 Preferred Pharmacy Pharmacy Name Phone [facility] [phone] Your Updated Medication List  
  
   
This list is accurate as of: 4/18/17  9:17 AM.  Always use your most recent med list.  
  
  
  
  
 ALPRAZolam 0.25 mg tablet Commonly known as:  [place] Dials Take 1 Tab by mouth daily. Max Daily Amount: 0.25 mg. Take 1 tab po around 12 PM  
  
 aspirin 325 mg tablet Commonly known as:  ASPIRIN Take 325 mg by mouth daily. atenolol 25 mg tablet Commonly known as:  TENORMIN Take 25 mg by mouth daily. donepezil 5 mg tablet Commonly known as:  ARICEPT Take 1 Tab by mouth nightly. escitalopram oxalate 10 mg tablet Commonly known as:  Ena Gambler Take 1 Tab by mouth daily. Take 1/2 daily  
  
 food supplemt, lactose-reduced Liqd Commonly known as:  ENSURE Take 237 mL by mouth daily. ofloxacin 0.3 % ophthalmic solution Commonly known as:  FLOXIN Administer 3 Drops to both eyes four (4) times daily. prednisoLONE acetate 1 % ophthalmic suspension Commonly known as:  PRED FORTE Administer 1 Drop to both eyes four (4) times daily. QUEtiapine 25 mg tablet Commonly known as:  SEROquel Take 1 Tab by mouth nightly. simvastatin 20 mg tablet Commonly known as:  ZOCOR  
TAKE 1 TABLET BY MOUTH NIGHTLY. STOP ZOCOR 40MG. therapeutic multivitamin tablet Commonly known as:  [facility] Take 1 Tab by mouth daily. Prescriptions Printed Refills ALPRAZolam (XANAX) 0.25 mg tablet 3 Sig: Take 1 Tab by mouth daily. Max Daily Amount: 0.25 mg. Take 1 tab po around 12 PM  
 Class: Print Route: Oral  
  
Prescriptions Sent to Pharmacy Refills QUEtiapine (SEROQUEL) 25 mg tablet 3 Sig: Take 1 Tab by mouth nightly. Class: Normal  
 Pharmacy: [address]  #: [phone] Route: Oral  
  
Patient Instructions Helping A Person With Dementia: Care Instructions Your Care Instructions Dementia is a loss of mental skills that affects daily life. It is different from mild memory loss that occurs with aging. Dementia can cause problems with memory, thinking clearly, and planning. It is different for everyone. But it usually gets worse slowly.  Some people who have dementia can function well for a long time. But at some point it may become hard for the person to care for himself or herself. It can be upsetting to learn that a loved one has this condition. You may be afraid and worried about what will happen. You may wonder how you will care for the person. There is no cure for dementia. But medicine may be able to slow memory loss and improve thinking for a while. Other medicines may help with sleep, depression, and behavior changes. Dementia is different for everyone. In some cases, people can function well for a long time. You can help your loved one by making his or her home life easier and safer. You also need to take care of yourself. Caregiving can be stressful. But support is available to help you and give you a break when you need it. The Alzheimer's Association offers good information and support. If you are caring for someone with dementia, you can help make life safer and more comfortable. You can also help your loved one make decisions about future care. You may also want to bring up legal and financial issues. These are hard but important conversations to have. Follow-up care is a key part of your loved one's treatment and safety. Be sure to make and go to all appointments, and call your doctor if your loved one is having problems. It's also a good idea to know your loved one's test results and keep a list of the medicines he or she takes. How can you care for your loved one at home? Taking care of the person · If the person takes medicine for dementia, help him or her take it exactly as prescribed. Call the doctor if you notice any problems with the medicine. · Make a list of the person's medicines. Review it with all of his or her doctors. · Help the person eat a balanced diet. Serve plenty of whole grains, fruits, and vegetables every day. If the person is not hungry at mealtimes, give snacks at midmorning and in the afternoon.  Offer drinks such as Boost, Ensure, or Sustacal if the person is losing weight. · Encourage exercise. Walking and other activities may slow the decline of mental ability. Help the person stay active mentally with reading, crossword puzzles, or other hobbies. · Take steps to help if the person is sundowning. This is the restless behavior and trouble with sleeping that may occur in late afternoon and at night. Try not to let the person nap during the day. Offer a glass of warm milk or caffeine-free tea before bedtime. · Develop a routine. Your loved one will feel less frustrated or confused with a clear, simple plan of what to do every day. · Be patient. A task may take the person longer than it used to. · For as long as he or she is able, allow your loved one to make decisions about activities, food, clothing, and other choices. Let him or her be independent, even if tasks take more time or are not done perfectly. Tailor tasks to the person's abilities. For example, if cooking is no longer safe, ask for other help. Your loved one can help set the table, or make simple dishes such as a salad. When the person needs help, offer it gently. Staying safe · Make your home (or your loved one's home) safe. Tack down rugs, and put no-slip tape in the tub. Install handrails, and put safety switches on stoves and appliances. Keep rooms free of clutter. Make sure walkways around furniture are clear. Do not move furniture around, because the person may become confused. · Use locks on doors and cupboards. Lock up knives, scissors, medicines, cleaning supplies, and other dangerous things. · Do not let the person drive or cook if he or she can't do it safely. A person with dementia should not drive unless he or she is able to pass an on-road driving test. Your state 's license bureau can do a driving test if there is any question.  
· Get medical alert jewelry for the person so that you can be contacted if he or she wanders away. If possible, provide a safe place for wandering, such as an enclosed yard or garden. Taking care of yourself · Ask your doctor about support groups and other resources in your area. · Take care of your health. Be sure to eat healthy foods and get enough rest and exercise. · Take time for yourself. Respite services provide someone to stay with the person for a short time while you get out of the house for a few hours. · Make time for an activity that you enjoy. Read, listen to music, paint, do crafts, or play an instrument, even if it's only for a few minutes a day. · Spend time with family, friends, and others in your support system. When should you call for help? Call 911 anytime you think the person may need emergency care. For example, call if: · The person who has dementia wanders away and you can't find him or her. · The person who has dementia is seriously injured. Call the doctor now or seek immediate medical care if: · The person suddenly sees things that are not there (hallucinates). · The person has a sudden change in his or her behavior. Watch closely for changes in the person's health, and be sure to contact the doctor if: · The person has symptoms that could cause injury. · The person has problems with his or her medicine. · You need more information to care for a person with dementia. · You need respite care so you can take a break. Where can you learn more? Go to http://kathe-millicent.info/. Enter J164 in the search box to learn more about \"Helping A Person With Dementia: Care Instructions. \" Current as of: July 26, 2016 Content Version: 11.2 © 8387-0879 [employer]. Care instructions adapted under license by [employer] (which disclaims liability or warranty for this information).  If you have questions about a medical condition or this instruction, always ask your healthcare professional. [employer] disclaims any warranty or liability for your use of this information. Introducing [facility] & HEALTH [facility]! Alaina Evangelista introduces PostHelpers patient portal. Now you can access parts of your medical record, email your doctor's office, and request medication refills online. 1. In your internet browser, go to [email or web address] 2. Click on the First Time User? Click Here link in the Sign In box. You will see the New Member Sign Up page. 3. Enter your PostHelpers Access Code exactly as it appears below. You will not need to use this code after youve completed the sign-up process. If you do not sign up before the expiration date, you must request a new code. · PostHelpers Access Code: [email or web address] Expires: 4/24/2017  1:24 PM 
 
4. Enter the last four digits of your Social Security Number (xxxx) and Date of Birth (mm/dd/yyyy) as indicated and click Submit. You will be taken to the next sign-up page. 5. Create a PostHelpers ID. This will be your PostHelpers login ID and cannot be changed, so think of one that is secure and easy to remember. 6. Create a PostHelpers password. You can change your password at any time. 7. Enter your Password Reset Question and Answer. This can be used at a later time if you forget your password. 8. Enter your e-mail address. You will receive e-mail notification when new information is available in [address] 9. Click Sign Up. You can now view and download portions of your medical record. 10. Click the Download Summary menu link to download a portable copy of your medical information. If you have questions, please visit the Frequently Asked Questions section of the PostHelpers website. Remember, PostHelpers is NOT to be used for urgent needs. For medical emergencies, dial 911. Now available from your iPhone and Android! Please provide this summary of care documentation to your next provider. Your primary care clinician is listed as Vicki Rangel. If you have any questions after today's visit, please call [phone].

## 2017-04-18 NOTE — PATIENT INSTRUCTIONS
Helping A Person With Dementia: Care Instructions  Your Care Instructions  Dementia is a loss of mental skills that affects daily life. It is different from mild memory loss that occurs with aging. Dementia can cause problems with memory, thinking clearly, and planning. It is different for everyone. But it usually gets worse slowly. Some people who have dementia can function well for a long time. But at some point it may become hard for the person to care for himself or herself. It can be upsetting to learn that a loved one has this condition. You may be afraid and worried about what will happen. You may wonder how you will care for the person. There is no cure for dementia. But medicine may be able to slow memory loss and improve thinking for a while. Other medicines may help with sleep, depression, and behavior changes. Dementia is different for everyone. In some cases, people can function well for a long time. You can help your loved one by making his or her home life easier and safer. You also need to take care of yourself. Caregiving can be stressful. But support is available to help you and give you a break when you need it. The Alzheimer's Association offers good information and support. If you are caring for someone with dementia, you can help make life safer and more comfortable. You can also help your loved one make decisions about future care. You may also want to bring up legal and financial issues. These are hard but important conversations to have. Follow-up care is a key part of your loved one's treatment and safety. Be sure to make and go to all appointments, and call your doctor if your loved one is having problems. It's also a good idea to know your loved one's test results and keep a list of the medicines he or she takes. How can you care for your loved one at home? Taking care of the person  · If the person takes medicine for dementia, help him or her take it exactly as prescribed. Call the doctor if you notice any problems with the medicine. · Make a list of the person's medicines. Review it with all of his or her doctors. · Help the person eat a balanced diet. Serve plenty of whole grains, fruits, and vegetables every day. If the person is not hungry at mealtimes, give snacks at midmorning and in the afternoon. Offer drinks such as Boost, Ensure, or Sustacal if the person is losing weight. · Encourage exercise. Walking and other activities may slow the decline of mental ability. Help the person stay active mentally with reading, crossword puzzles, or other hobbies. · Take steps to help if the person is sundowning. This is the restless behavior and trouble with sleeping that may occur in late afternoon and at night. Try not to let the person nap during the day. Offer a glass of warm milk or caffeine-free tea before bedtime. · Develop a routine. Your loved one will feel less frustrated or confused with a clear, simple plan of what to do every day. · Be patient. A task may take the person longer than it used to. · For as long as he or she is able, allow your loved one to make decisions about activities, food, clothing, and other choices. Let him or her be independent, even if tasks take more time or are not done perfectly. Tailor tasks to the person's abilities. For example, if cooking is no longer safe, ask for other help. Your loved one can help set the table, or make simple dishes such as a salad. When the person needs help, offer it gently. Staying safe  · Make your home (or your loved one's home) safe. Tack down rugs, and put no-slip tape in the tub. Install handrails, and put safety switches on stoves and appliances. Keep rooms free of clutter. Make sure walkways around furniture are clear. Do not move furniture around, because the person may become confused. · Use locks on doors and cupboards.  Lock up knives, scissors, medicines, cleaning supplies, and other dangerous things. · Do not let the person drive or cook if he or she can't do it safely. A person with dementia should not drive unless he or she is able to pass an on-road driving test. Your state 's license bureau can do a driving test if there is any question. · Get medical alert jewelry for the person so that you can be contacted if he or she wanders away. If possible, provide a safe place for wandering, such as an enclosed yard or garden. Taking care of yourself  · Ask your doctor about support groups and other resources in your area. · Take care of your health. Be sure to eat healthy foods and get enough rest and exercise. · Take time for yourself. Respite services provide someone to stay with the person for a short time while you get out of the house for a few hours. · Make time for an activity that you enjoy. Read, listen to music, paint, do crafts, or play an instrument, even if it's only for a few minutes a day. · Spend time with family, friends, and others in your support system. When should you call for help? Call 911 anytime you think the person may need emergency care. For example, call if:  · The person who has dementia wanders away and you can't find him or her. · The person who has dementia is seriously injured. Call the doctor now or seek immediate medical care if:  · The person suddenly sees things that are not there (hallucinates). · The person has a sudden change in his or her behavior. Watch closely for changes in the person's health, and be sure to contact the doctor if:  · The person has symptoms that could cause injury. · The person has problems with his or her medicine. · You need more information to care for a person with dementia. · You need respite care so you can take a break. Where can you learn more? Go to http://kathe-millicent.info/. Enter E689 in the search box to learn more about \"Helping A Person With Dementia: Care Instructions. \"  Current as of: July 26, 2016  Content Version: 11.2  © 8728-7018 [employer]. Care instructions adapted under license by [employer] (which disclaims liability or warranty for this information). If you have questions about a medical condition or this instruction, always ask your healthcare professional. [place] [address] any warranty or liability for your use of this information.

## 2017-04-18 NOTE — PROGRESS NOTES
HISTORY OF PRESENT ILLNESS  Ubaldo Arauz is a 80 y.o. male here accompanied by his daughter and care giver. his dementia is worse now. UA and labs are checked,are stable. he has paranoid ideation that peoples are talking about himself,treated him like a child and telling him that he is a worse person. All makes him very anxious. he is upset. Depression is stable. has memory problem. doing well.will see psych soon. Reports compliance with medications and diet. Med list and most recent labs/studies reviewed with pt. Not active physically to control weight. Needs med refills. Reports no other new c/o. Dementia    His past medical history is significant for hypertension. Anxiety     Altered mental status    His past medical history is significant for hypertension. Weight Loss     Hypertension    Associated symptoms include anxiety. Pertinent negatives include no blurred vision. Diarrhea    Pertinent negatives include no chills. Palpitations   Pertinent negatives include no fever. His past medical history is significant for hypertension. Review of Systems   Constitutional: Negative. Negative for chills and fever. HENT: Negative. Eyes: Negative. Negative for blurred vision, double vision and photophobia. Respiratory: Negative. Cardiovascular: Negative. Gastrointestinal: Negative. Genitourinary: Negative. Musculoskeletal: Negative. Skin: Negative. Neurological: Negative. Endo/Heme/Allergies: Negative. Psychiatric/Behavioral: Positive for depression and memory loss. Negative for substance abuse and suicidal ideas. The patient is nervous/anxious. Physical Exam   Constitutional: He appears well-developed and well-nourished. No distress. Neck: Normal range of motion. Neck supple. No JVD present. No thyromegaly present. Cardiovascular: Normal rate, regular rhythm, normal heart sounds and intact distal pulses.     Pulmonary/Chest: Effort normal and breath sounds normal. No respiratory distress. He has no wheezes. He has no rales. Abdominal:   KUB NT   Musculoskeletal: He exhibits no edema or tenderness. Lymphadenopathy:     He has no cervical adenopathy. Psychiatric: He has a normal mood and affect. His behavior is normal.   Depression. demented       ASSESSMENT and PLAN    Ashwin Garrison was seen today for dementia and anxiety. Diagnoses and all orders for this visit:    Dementia with behavioral disturbance, unspecified dementia type    exelon patch was D/c d.aricept was restarted last week. 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 Paranoia ([address]  Will add,  -     QUEtiapine (SEROQUEL) 25 mg tablet; Take 1 Tab by mouth nightly. Anxiety  Will add,  -     ALPRAZolam (XANAX) 0.25 mg tablet; Take 1 Tab by mouth daily. Max Daily Amount: 0.25 mg. Take 1 tab po around 12 PM        Return if symptoms worsen or fail to improve. Advised him to call back or return to office if symptoms worsen/change/persist.   Discussed expected course/resolution/complications of diagnosis in detail with patient. Medication risks/benefits/costs/interactions/alternatives discussed with patient. He was given an after visit summary which includes diagnoses, current medications, & vitals. He expressed understanding with the diagnosis and plan.

## 2017-05-03 ENCOUNTER — TELEPHONE (OUTPATIENT)
Dept: INTERNAL MEDICINE CLINIC | Age: 82
End: 2017-05-03

## 2017-05-03 DIAGNOSIS — F41.9 ANXIETY: Primary | ICD-10-CM

## 2017-05-03 RX ORDER — ALPRAZOLAM 0.25 MG/1
0.25 TABLET ORAL 2 TIMES DAILY
Qty: 60 TAB | Refills: 0 | Status: SHIPPED | OUTPATIENT
Start: 2017-05-03 | End: 2017-06-07 | Stop reason: SDUPTHER

## 2017-05-03 NOTE — TELEPHONE ENCOUNTER
Pts daughter called and stated that pt is showing more aggravation and anxiety around 10:30 am. Would like to know if they can increase dose of Ativan. Pts daughter r/s pt to come in to be seen on 5/9/17. Please contact Michelle to advise. Thank you.

## 2017-05-03 NOTE — TELEPHONE ENCOUNTER
Writer spoke with deepthi and she stated that pt has been tolerating and doing well with xanax at 12noon and serquel at HS but the last couple days at around 10:30am pt becomes aggitated and increased anxiety. Conferred with Dr Daniel Butcher, will increase Xanax 0.25mg to BID, 1 in am and 1 at 12noon.  Deepthi made aware and verbal order for xanax placed to pharm

## 2017-05-04 ENCOUNTER — HOSPITAL ENCOUNTER (OUTPATIENT)
Dept: LAB | Age: 82
Discharge: HOME | End: 2017-05-04
Payer: MEDICARE

## 2017-05-04 ENCOUNTER — OFFICE VISIT (OUTPATIENT)
Dept: INTERNAL MEDICINE CLINIC | Age: 82
End: 2017-05-04

## 2017-05-04 VITALS
OXYGEN SATURATION: 98 % | BODY MASS INDEX: 21.94 KG/M2 | DIASTOLIC BLOOD PRESSURE: 70 MMHG | WEIGHT: 171 LBS | HEART RATE: 60 BPM | RESPIRATION RATE: 18 BRPM | SYSTOLIC BLOOD PRESSURE: 134 MMHG | HEIGHT: 74 IN

## 2017-05-04 DIAGNOSIS — F03.90 DEMENTIA WITHOUT BEHAVIORAL DISTURBANCE, UNSPECIFIED DEMENTIA TYPE: ICD-10-CM

## 2017-05-04 DIAGNOSIS — F03.91 DEMENTIA WITH BEHAVIORAL DISTURBANCE, UNSPECIFIED DEMENTIA TYPE: Primary | ICD-10-CM

## 2017-05-04 DIAGNOSIS — K59.00 CONSTIPATION, UNSPECIFIED CONSTIPATION TYPE: ICD-10-CM

## 2017-05-04 DIAGNOSIS — F32.A DEPRESSION, UNSPECIFIED DEPRESSION TYPE: ICD-10-CM

## 2017-05-04 DIAGNOSIS — R45.1 AGITATION: ICD-10-CM

## 2017-05-04 PROCEDURE — 87088 URINE BACTERIA CULTURE: CPT

## 2017-05-04 RX ORDER — AMOXICILLIN 250 MG
1 CAPSULE ORAL
Qty: 60 TAB | Refills: 11 | Status: SHIPPED | OUTPATIENT
Start: 2017-05-04

## 2017-05-04 RX ORDER — CEPHALEXIN 500 MG/1
500 CAPSULE ORAL 2 TIMES DAILY
Qty: 10 CAP | Refills: 0 | Status: SHIPPED | OUTPATIENT
Start: 2017-05-04 | End: 2017-05-09

## 2017-05-04 RX ORDER — DONEPEZIL HYDROCHLORIDE 10 MG/1
10 TABLET, FILM COATED ORAL
Qty: 30 TAB | Refills: 11 | Status: SHIPPED | OUTPATIENT
Start: 2017-05-04 | End: 2018-05-18

## 2017-05-04 NOTE — PROGRESS NOTES
Reviewed record in preparation for visit and have obtained necessary documentation. Identified pt with two pt identifiers(name and ). Health Maintenance Due   Topic    DTaP/Tdap/Td series (1 - Tdap)    Pneumococcal 65+ Low/Medium Risk (1 of 2 - PCV13)    MEDICARE YEARLY EXAM          Chief Complaint   Patient presents with    Agitation    Dementia    Constipation    Urinary Hesitancy          Learning Assessment:  :     Learning Assessment 2015   PRIMARY LEARNER Patient   HIGHEST LEVEL OF EDUCATION - PRIMARY LEARNER  4 YEARS OF COLLEGE   BARRIERS PRIMARY LEARNER COGNITIVE   CO-LEARNER CAREGIVER Yes   PRIMARY LANGUAGE ENGLISH   LEARNER PREFERENCE PRIMARY READING   ANSWERED BY patient   RELATIONSHIP SELF       Depression Screening:  :     PHQ over the last two weeks 2014   Little interest or pleasure in doing things Several days   Feeling down, depressed or hopeless Several days   Total Score PHQ 2 2       Fall Risk Assessment:  :     Fall Risk Assessment, last 12 mths 2017   Able to walk? Yes   Fall in past 12 months? No       Abuse Screening:  :     Abuse Screening Questionnaire 3/24/2015   Do you ever feel afraid of your partner? N   Are you in a relationship with someone who physically or mentally threatens you? N   Is it safe for you to go home? Y       Coordination of Care Questionnaire:  :     1) Have you been to an emergency room, urgent care clinic since your last visit? no   Hospitalized since your last visit? no             2) Have you seen or consulted any other health care providers outside of [address] [place] [address] since your last visit? no  (Include any pap smears or colon screenings in this section.)    3) Do you have an Advance Directive on file? no    4) Are you interested in receiving information on Advance Directives?  NO      Patient is accompanied by adult caretaker I have received verbal consent from [facility] to discuss any/all medical information while they are present in the room.

## 2017-05-04 NOTE — MR AVS SNAPSHOT
Visit Information Date & Time Provider Department Dept. Phone Encounter #  
 5/4/2017 12:30 PM Ray Villalobos, [address] [phone] [id number] Follow-up Instructions Return in about 2 weeks (around 5/18/2017). Your Appointments 5/9/2017 11:15 AM  
ROUTINE CARE with Donis Rodríguez MD  
[address] ([facility]) Appt Note: 1 month follow up; r/s 1 month follow up and med check [address] [place] [address]. [facility] 7 [id number] [phone]  
  
   
 [address] [place] [address]. [address] [place] [phone] Upcoming Health Maintenance Date Due DTaP/Tdap/Td series (1 - Tdap) 2/3/1955 Pneumococcal 65+ Low/Medium Risk (1 of 2 - PCV13) 2/3/1999 MEDICARE YEARLY EXAM 11/4/2016 INFLUENZA AGE 9 TO ADULT 8/1/2017 GLAUCOMA SCREENING Q2Y 4/18/2018 Allergies as of 5/4/2017  Review Complete On: 5/4/2017 By: Ray Villalobos, DO Severity Noted Reaction Type Reactions Chocolate [Cocoa]  12/16/2014    Sneezing No longer allergic Namenda [Memantine]  12/16/2014    Shortness of Breath Current Immunizations  Reviewed on 3/12/2015 Name Date Influenza Vaccine 11/1/2014 Zoster Vaccine, Live 12/23/2014 Not reviewed this visit You Were Diagnosed With   
  
 Codes Comments Dementia with behavioral disturbance, unspecified dementia type    -  Primary ICD-10-CM: F03.91 
ICD-9-CM: 294.21 Agitation     ICD-10-CM: R45.1 ICD-9-CM: 307.9 Constipation, unspecified constipation type     ICD-10-CM: K59.00 ICD-9-CM: 564.00 Depression, unspecified depression type     ICD-10-CM: F32.9 ICD-9-CM: 631 Dementia without behavioral disturbance, unspecified dementia type     ICD-10-CM: F03.90 ICD-9-CM: 294.20 Vitals BP Pulse Resp Height(growth percentile) Weight(growth percentile) SpO2 134/70 (BP 1 Location: Right arm, BP Patient Position: Sitting) 60 18 6' 2.02\" (1.88 m) 171 lb (77.6 kg) 98% BMI Smoking Status 21.94 kg/m2 Former Smoker Vitals History BMI and BSA Data Body Mass Index Body Surface Area  
 21.94 kg/m 2 2.01 m 2 Preferred Pharmacy Pharmacy Name Phone [facility] [phone] Your Updated Medication List  
  
   
This list is accurate as of: 5/4/17 12:46 PM.  Always use your most recent med list.  
  
  
  
  
 ALPRAZolam 0.25 mg tablet Commonly known as:  Mari Cumins Take 1 Tab by mouth two (2) times a day. Max Daily Amount: 0.5 mg. Take 1 tablet at 8am and 1 tab at 12 noon  
  
 aspirin 325 mg tablet Commonly known as:  ASPIRIN Take 325 mg by mouth daily. atenolol 25 mg tablet Commonly known as:  TENORMIN Take 25 mg by mouth daily. donepezil 10 mg tablet Commonly known as:  ARICEPT Take 1 Tab by mouth nightly. escitalopram oxalate 10 mg tablet Commonly known as:  Macario Fausto Take 1 Tab by mouth daily. Take 1/2 daily  
  
 food supplemt, lactose-reduced Liqd Commonly known as:  ENSURE Take 237 mL by mouth daily. QUEtiapine 25 mg tablet Commonly known as:  SEROquel Take 1 Tab by mouth nightly. senna-docusate 8.6-50 mg per tablet Commonly known as:  Pili Myers Take 1 Tab by mouth two (2) times daily as needed for Constipation. therapeutic multivitamin tablet Commonly known as:  [facility] Take 1 Tab by mouth daily. Prescriptions Sent to Pharmacy Refills  
 senna-docusate (PERICOLACE) 8.6-50 mg per tablet 11 Sig: Take 1 Tab by mouth two (2) times daily as needed for Constipation. Class: Normal  
 Pharmacy: [address] Ph #: [phone] Route: Oral  
 donepezil (ARICEPT) 10 mg tablet 11 Sig: Take 1 Tab by mouth nightly. Class: Normal  
 Pharmacy: [address] [place] [address]   #: [phone] Route: Oral  
  
Follow-up Instructions Return in about 2 weeks (around 5/18/2017). Introducing [facility]! [employer] introduces [employer] patient portal. Now you can access parts of your medical record, email your doctor's office, and request medication refills online. 1. In your internet browser, go to [email or web address] 2. Click on the First Time User? Click Here link in the Sign In box. You will see the New Member Sign Up page. 3. Enter your [employer] Access Code exactly as it appears below. You will not need to use this code after youve completed the sign-up process. If you do not sign up before the expiration date, you must request a new code. · [employer] Access Code: [email or web address] Expires: 8/2/2017 12:45 PM 
 
4. Enter the last four digits of your Social Security Number (xxxx) and Date of Birth (mm/dd/yyyy) as indicated and click Submit. You will be taken to the next sign-up page. 5. Create a [employer] ID. This will be your [employer] login ID and cannot be changed, so think of one that is secure and easy to remember. 6. Create a [employer] password. You can change your password at any time. 7. Enter your Password Reset Question and Answer. This can be used at a later time if you forget your password. 8. Enter your e-mail address. You will receive e-mail notification when new information is available in [address] 9. Click Sign Up. You can now view and download portions of your medical record. 10. Click the Download Summary menu link to download a portable copy of your medical information. If you have questions, please visit the Frequently Asked Questions section of the [employer] website. Remember, [employer] is NOT to be used for urgent needs. For medical emergencies, dial 911. Now available from your iPhone and Android! Please provide this summary of care documentation to your next provider. Your primary care clinician is listed as Elvis Marshall. If you have any questions after today's visit, please call [phone].

## 2017-05-05 ENCOUNTER — DOCUMENTATION ONLY (OUTPATIENT)
Dept: INTERNAL MEDICINE CLINIC | Age: 82
End: 2017-05-05

## 2017-05-05 LAB — BACTERIA UR CULT: NO GROWTH

## 2017-05-05 NOTE — PROGRESS NOTES
I spoke with daughter JONATHAN DUNCAN [facility] [employer]) and told her I had investigated her father's visit from the previous day with the  and the nurse. I also had her talk to the [address] JONATHAN [facility] seemed satisfied with our return call and said that her father had a follow up visit scheduled with Dr. Macario Pompa this coming Tuesday.

## 2017-05-05 NOTE — PROGRESS NOTES
Called daughter Eileen Archer [employer] [facility]) and left message letting her know I was following up on a concern she had from a visit with Dr. Ana Bautista yesterday.

## 2017-05-09 ENCOUNTER — OFFICE VISIT (OUTPATIENT)
Dept: INTERNAL MEDICINE CLINIC | Age: 82
End: 2017-05-09

## 2017-05-09 VITALS
DIASTOLIC BLOOD PRESSURE: 70 MMHG | WEIGHT: 169.2 LBS | HEART RATE: 68 BPM | RESPIRATION RATE: 20 BRPM | BODY MASS INDEX: 21.72 KG/M2 | HEIGHT: 74 IN | SYSTOLIC BLOOD PRESSURE: 114 MMHG | TEMPERATURE: 98 F | OXYGEN SATURATION: 98 %

## 2017-05-09 DIAGNOSIS — F03.91 DEMENTIA WITH BEHAVIORAL DISTURBANCE, UNSPECIFIED DEMENTIA TYPE: Primary | ICD-10-CM

## 2017-05-09 DIAGNOSIS — R45.1 AGITATION: ICD-10-CM

## 2017-05-09 DIAGNOSIS — R73.02 IMPAIRED GLUCOSE TOLERANCE: ICD-10-CM

## 2017-05-09 DIAGNOSIS — I25.10 CORONARY ARTERY DISEASE DUE TO LIPID RICH PLAQUE: ICD-10-CM

## 2017-05-09 DIAGNOSIS — I25.83 CORONARY ARTERY DISEASE DUE TO LIPID RICH PLAQUE: ICD-10-CM

## 2017-05-09 NOTE — PROGRESS NOTES
HISTORY OF PRESENT ILLNESS  Toan Boykin is a 80 y.o. male  accompanied by her daughter and care giver. He was confused last week,was thoguth to have UTI.keflex was given. urine culture came back negative. He is doing well this week. xanax dose was increased. feeling better. but his mood is stable and depression is under control. He has adv dementia. on meds. living in assisted living.memory is worse.has behaviour changes too. Follow-up     Dementia      Cholesterol Problem     Medication Problem     Diarrhea    Pertinent negatives include no chills. Review of Systems   Constitutional: Negative. Negative for chills and fever. HENT: Negative. Respiratory: Negative. Genitourinary: Negative. Musculoskeletal: Negative. Neurological: Negative. Psychiatric/Behavioral: Positive for memory loss. The patient is nervous/anxious. Physical Exam   Constitutional: He appears well-developed and well-nourished. No distress. Neck: Normal range of motion. Neck supple. No JVD present. No thyromegaly present. Cardiovascular: Normal rate, regular rhythm, normal heart sounds and intact distal pulses. Pulmonary/Chest: Effort normal and breath sounds normal. No respiratory distress. He has no wheezes. Abdominal: Soft. Bowel sounds are normal. He exhibits no distension. There is no tenderness. Psychiatric: He has a normal mood and affect. His behavior is normal.   Adv dementia       ASSESSMENT and PLAN  Brain Gill was seen today for follow-up, dementia, coronary artery disease and cholesterol problem. Diagnoses and all orders for this visit:    Dementia with behavioral disturbance, unspecified dementia type    aricept dosage was increased. Also on seroquel at night. He has 8 hr shift nurse care. he is eating 3 meals a day. complaint with meds. Feeling better. Impaired glucose tolerance    A1c was 5.8. Watch sweets.     Coronary artery disease due to lipid rich plaque    On ASA and tenormin. stable. Agitation    better now. thought he had UTI but urine culture was negative.kelflex was continued. Anxiety disorder    On lexapro. Xanax dose was increased to BID. Feeling better. Discussed expected course/resolution/complications of diagnosis in detail with patient. Medication risks/benefits/costs/interactions/alternatives discussed with patient. Pt was given an after visit summary which includes diagnoses, current medications & vitals. Pt expressed understanding with the diagnosis and plan.

## 2017-05-09 NOTE — MR AVS SNAPSHOT
Visit Information Date & Time Provider Department Dept. Phone Encounter #  
 5/9/2017 11:15 AM Shaw Dawkins MD [facility] Internal Medicine [address] [place] [address] [phone] [id number] Upcoming Health Maintenance Date Due DTaP/Tdap/Td series (1 - Tdap) 2/3/1955 Pneumococcal 65+ Low/Medium Risk (1 of 2 - PCV13) 2/3/1999 MEDICARE YEARLY EXAM 11/4/2016 INFLUENZA AGE 9 TO ADULT 8/1/2017 GLAUCOMA SCREENING Q2Y 4/18/2018 Allergies as of 5/9/2017  Review Complete On: 5/9/2017 By: Shaw Dawkins MD  
  
 Severity Noted Reaction Type Reactions Chocolate [Cocoa]  12/16/2014    Sneezing No longer allergic Namenda [Memantine]  12/16/2014    Shortness of Breath Current Immunizations  Reviewed on 3/12/2015 Name Date Influenza Vaccine 11/1/2014 Zoster Vaccine, Live 12/23/2014 Not reviewed this visit You Were Diagnosed With   
  
 Codes Comments Dementia with behavioral disturbance, unspecified dementia type    -  Primary ICD-10-CM: F03.91 
ICD-9-CM: 294.21 Impaired glucose tolerance     ICD-10-CM: R73.02 
ICD-9-CM: 790.22 Coronary artery disease due to lipid rich plaque     ICD-10-CM: I25.10, I25.83 ICD-9-CM: 414.00, 414.3 Agitation     ICD-10-CM: R45.1 ICD-9-CM: 307.9 Vitals BP Pulse Temp Resp Height(growth percentile) Weight(growth percentile) 114/70 (BP 1 Location: Right arm, BP Patient Position: Sitting) 68 98 °F (36.7 °C) (Oral) 20 6' 2\" (1.88 m) 169 lb 3.2 oz (76.7 kg) SpO2 BMI Smoking Status 98% 21.72 kg/m2 Former Smoker Vitals History BMI and BSA Data Body Mass Index Body Surface Area 21.72 kg/m 2 2 m 2 Preferred Pharmacy Pharmacy Name Phone [facility] [phone] Your Updated Medication List  
  
   
This list is accurate as of: 5/9/17 11:37 AM.  Always use your most recent med list.  
  
  
  
  
 ALPRAZolam 0.25 mg tablet Commonly known as:  Jesi Solo Take 1 Tab by mouth two (2) times a day. Max Daily Amount: 0.5 mg. Take 1 tablet at 8am and 1 tab at 12 noon  
  
 aspirin 325 mg tablet Commonly known as:  ASPIRIN Take 325 mg by mouth daily. atenolol 25 mg tablet Commonly known as:  TENORMIN Take 25 mg by mouth daily. cephALEXin 500 mg capsule Commonly known as:  Omie Neer Take 1 Cap by mouth two (2) times a day for 5 days. donepezil 10 mg tablet Commonly known as:  ARICEPT Take 1 Tab by mouth nightly. escitalopram oxalate 10 mg tablet Commonly known as:  Ena Gambler Take 1 Tab by mouth daily. Take 1/2 daily  
  
 food supplemt, lactose-reduced Liqd Commonly known as:  ENSURE Take 237 mL by mouth daily. QUEtiapine 25 mg tablet Commonly known as:  SEROquel Take 1 Tab by mouth nightly. senna-docusate 8.6-50 mg per tablet Commonly known as:  Corrinne Heft Take 1 Tab by mouth two (2) times daily as needed for Constipation. therapeutic multivitamin tablet Commonly known as:  [facility] Take 1 Tab by mouth daily. Introducing [facility]! [employer] introduces [employer] patient portal. Now you can access parts of your medical record, email your doctor's office, and request medication refills online. 1. In your internet browser, go to [email or web address] 2. Click on the First Time User? Click Here link in the Sign In box. You will see the New Member Sign Up page. 3. Enter your [employer] Access Code exactly as it appears below. You will not need to use this code after youve completed the sign-up process. If you do not sign up before the expiration date, you must request a new code. · [employer] Access Code: [email or web address] Expires: 8/2/2017 12:45 PM 
 
4. Enter the last four digits of your Social Security Number (xxxx) and Date of Birth (mm/dd/yyyy) as indicated and click Submit.  You will be taken to the next sign-up page. 5. Create a "[employer] ID. This will be your "[employer] login ID and cannot be changed, so think of one that is secure and easy to remember. 6. Create a "[employer] password. You can change your password at any time. 7. Enter your Password Reset Question and Answer. This can be used at a later time if you forget your password. 8. Enter your e-mail address. You will receive e-mail notification when new information is available in [address] 9. Click Sign Up. You can now view and download portions of your medical record. 10. Click the Download Summary menu link to download a portable copy of your medical information. If you have questions, please visit the Frequently Asked Questions section of the "[employer] website. Remember, "[employer] is NOT to be used for urgent needs. For medical emergencies, dial 911. Now available from your iPhone and Android! Please provide this summary of care documentation to your next provider. Your primary care clinician is listed as Cocnepcion Anthony. If you have any questions after today's visit, please call [phone].

## 2017-05-09 NOTE — PROGRESS NOTES
Health Maintenance Due   Topic Date Due    DTaP/Tdap/Td series (1 - Tdap) 02/03/1955    Pneumococcal 65+ Low/Medium Risk (1 of 2 - PCV13) 02/03/1999    MEDICARE YEARLY EXAM  11/04/2016       Chief Complaint   Patient presents with    Follow-up     1 month, currently on Keflex for UTI, family and caregiver states improvement    Dementia    Coronary Artery Disease    Cholesterol Problem       1. Have you been to the ER, urgent care clinic since your last visit? Hospitalized since your last visit? No    2. Have you seen or consulted any other health care providers outside of the [address] [place] [address] since your last visit? Include any pap smears or colon screening. No    3) Do you have an Advance Directive on file? no    4) Are you interested in receiving information on Advance Directives? NO      Patient is accompanied by daughter and adult caretaker I have received verbal consent from Barbara Brown to discuss any/all medical information while they are present in the room.

## 2017-05-29 NOTE — PROGRESS NOTES
HISTORY OF PRESENT ILLNESS  Jj Armas is a 80 y.o. male. Pt comes in for f/u. Poor historian with dementia. According to care giver has been more confused. H/o urine incontinence with bed wetting. Pt denies any problems and asking why he is here. Denies any pain or respiratory problems. Has history of constipation. PMH, Allergies, Social Hx, Updated Med list, and Most recent available labs/studies reviewed. Reports compliance with medications and diet. Reports no other new c/o. Agitation   Pertinent negatives include no chest pain, no abdominal pain and no shortness of breath. Dementia      Constipation    Associated symptoms include constipation. Pertinent negatives include no abdominal pain and no dysuria. Urinary Hesitancy   Pertinent negatives include no chest pain, no abdominal pain and no shortness of breath. Altered mental status          Review of Systems   Constitutional: Negative. HENT: Negative. Eyes: Negative. Negative for blurred vision. Respiratory: Negative. Negative for shortness of breath. Cardiovascular: Negative for chest pain and leg swelling. Gastrointestinal: Positive for constipation. Negative for abdominal pain. Genitourinary: Positive for frequency, hesitancy and urgency. Negative for dysuria, flank pain and hematuria. Musculoskeletal: Negative. Skin: Negative. Neurological: Negative for dizziness and focal weakness. Endo/Heme/Allergies: Negative. Psychiatric/Behavioral: Positive for memory loss. Negative for depression. Physical Exam   Constitutional: He appears well-developed and well-nourished. No distress. HENT:   Head: Normocephalic and atraumatic. Mouth/Throat: No oropharyngeal exudate. Eyes: No scleral icterus. Neck: Normal range of motion. Neck supple. No JVD present. No thyromegaly present. Cardiovascular: Normal rate, regular rhythm, normal heart sounds and intact distal pulses. No murmur heard.   Pulmonary/Chest: Effort normal and breath sounds normal. No respiratory distress. He has no wheezes. He has no rales. Abdominal: He exhibits no distension. There is no tenderness. Musculoskeletal: He exhibits no edema or tenderness. Neurological: He is alert. Coordination normal.   A+O x 2  Confused  Keeps repeating old stories   Skin: No rash noted. Psychiatric: He has a normal mood and affect. His behavior is normal.       ASSESSMENT and PLAN  Duane Tompkins was seen today for agitation, dementia, constipation and urinary hesitancy. Diagnoses and all orders for this visit:    Dementia with behavioral disturbance, unspecified dementia type  -     CULTURE, URINE  -     AMB POC URINALYSIS DIP STICK MANUAL W/ MICRO    Agitation  -     CULTURE, URINE  -     AMB POC URINALYSIS DIP STICK MANUAL W/ MICRO    Constipation, unspecified constipation type  -     AMB POC URINALYSIS DIP STICK MANUAL W/ MICRO    Depression, unspecified depression type  -     AMB POC URINALYSIS DIP STICK MANUAL W/ MICRO    Dementia without behavioral disturbance, unspecified dementia type  -     donepezil (ARICEPT) 10 mg tablet; Take 1 Tab by mouth nightly. -     AMB POC URINALYSIS DIP STICK MANUAL W/ MICRO    Other orders  -     senna-docusate (PERICOLACE) 8.6-50 mg per tablet; Take 1 Tab by mouth two (2) times daily as needed for Constipation. -     cephALEXin (KEFLEX) 500 mg capsule; Take 1 Cap by mouth two (2) times a day for 5 days. Follow-up Disposition:  Return in about 2 weeks (around 5/18/2017).    lab results and schedule of future lab studies reviewed with patient  reviewed diet, exercise and weight control  reviewed medications and side effects in detail  I called his daughter/POA and discussed my findings and plan

## 2017-06-08 DIAGNOSIS — E78.2 MIXED HYPERLIPIDEMIA: ICD-10-CM

## 2017-06-08 RX ORDER — SIMVASTATIN 20 MG/1
TABLET, FILM COATED ORAL
Qty: 30 TAB | Refills: 5 | Status: SHIPPED | OUTPATIENT
Start: 2017-06-08 | End: 2018-05-18

## 2017-06-20 ENCOUNTER — TELEPHONE (OUTPATIENT)
Dept: INTERNAL MEDICINE CLINIC | Age: 82
End: 2017-06-20

## 2017-06-20 ENCOUNTER — OFFICE VISIT (OUTPATIENT)
Dept: INTERNAL MEDICINE CLINIC | Age: 82
End: 2017-06-20

## 2017-06-20 VITALS
WEIGHT: 172 LBS | SYSTOLIC BLOOD PRESSURE: 108 MMHG | TEMPERATURE: 98.7 F | HEIGHT: 74 IN | RESPIRATION RATE: 20 BRPM | HEART RATE: 67 BPM | OXYGEN SATURATION: 99 % | BODY MASS INDEX: 22.07 KG/M2 | DIASTOLIC BLOOD PRESSURE: 58 MMHG

## 2017-06-20 DIAGNOSIS — G30.1 LATE ONSET ALZHEIMER'S DISEASE WITHOUT BEHAVIORAL DISTURBANCE (HCC): Primary | ICD-10-CM

## 2017-06-20 DIAGNOSIS — E78.2 MIXED HYPERLIPIDEMIA: ICD-10-CM

## 2017-06-20 DIAGNOSIS — R29.898 WEAKNESS OF BOTH LEGS: ICD-10-CM

## 2017-06-20 DIAGNOSIS — R26.9 GAIT ABNORMALITY: ICD-10-CM

## 2017-06-20 DIAGNOSIS — I25.83 CORONARY ARTERY DISEASE DUE TO LIPID RICH PLAQUE: ICD-10-CM

## 2017-06-20 DIAGNOSIS — F02.80 LATE ONSET ALZHEIMER'S DISEASE WITHOUT BEHAVIORAL DISTURBANCE (HCC): Primary | ICD-10-CM

## 2017-06-20 DIAGNOSIS — I25.10 CORONARY ARTERY DISEASE DUE TO LIPID RICH PLAQUE: ICD-10-CM

## 2017-06-20 NOTE — PATIENT INSTRUCTIONS
Helping a Person With Alzheimer's Disease: Care Instructions  Your Care Instructions  Alzheimer's disease is a type of dementia. It affects memory, intelligence, judgment, language, and behavior. It is not clear what causes this disease. But it is the most common form of dementia in older adults. It may take many years to develop. Alzheimer's disease is different than mild memory loss that occurs with aging. Family members usually notice symptoms first. But the person also may realize that something is wrong. Follow-up care is a key part of your loved one's treatment and safety. Be sure to make and go to all appointments, and call your doctor if your loved one is having problems. It's also a good idea to know your loved one's test results and keep a list of the medicines he or she takes. How can you care for your loved one at home? · Develop a routine. The person will feel less frustrated or confused with a clear, simple daily plan. Remind him or her about important facts and events. · Be patient. It may take longer for the person to complete a task than it used to. · Help the person eat a balanced diet. Serve plenty of whole grains, fruits, and vegetables every day. If the person is not eating well at mealtimes, give snacks at midmorning and in the afternoon. Offer drinks such as Boost, Ensure, or Sustacal if he or she is losing weight. · Encourage exercise. Walking and other activity may slow the decline of mental ability. Help the person keep an active mind. Encourage hobbies such as reading and crossword puzzles. · Take steps to help if the person is sundowning. This is the restless behavior and trouble with sleeping that may occur in late afternoon and at night. Try not to let the person nap during the day. Offer a glass of warm milk or caffeine-free tea before bedtime. · Ask family members and friends for help. You may need breaks where others can help care for the person.   · Talk to the person's doctor about what resources are available for help in your area. · Review all of the person's medicines with his or her doctor. · For as long as the person is able, allow him or her to make decisions about activities, food, clothing, and other choices. Let the person be independent, even if tasks take more time or are not done perfectly. Tailor tasks to the person's abilities. For example, if cooking is no longer safe, ask for other help. He or she can help set the table or make simple dishes such as a salad. When the person needs help, offer it gently. Keeping safe  · Make your home (or the person's home) safe. Tack down rugs, and put no-slip tape in the tub. Install handrails, and put safety switches on stoves and appliances. Keep rooms free of clutter. Make sure walkways around furniture are clear. Do not move furniture around, because the person may become confused. · Use locks on doors and cupboards. Lock up knives, scissors, medicines, cleaning supplies, and other dangerous things. · Do not let the person drive or cook if he or she cannot do it safely. A person with Alzheimer's should not drive unless he or she is able to pass an on-road driving test. Your state 's license bureau can do a driving test if there is any question. · Get medical alert jewelry for the person so you can be contacted if he or she wanders away. If possible, provide a safe place for wandering, such as an enclosed yard or garden. When should you call for help? Call 911 anytime you think you may need emergency care. For example, call if:  · A person who has Alzheimer's disease has disappeared. · A person who has Alzheimer's disease is seriously injured. Call your doctor now or seek immediate medical care if:  · The person you are caring for suddenly sees or hears things that are not there (hallucinates). · The person you are caring for has a sudden, drastic change in his or her behavior.   Watch closely for changes in your loved one's health, and be sure to contact the doctor if:  · A person who has Alzheimer's disease gradually gets worse or has symptoms that could cause injury. · You need help caring for a person with Alzheimer's disease. · The person has problems with his or her medicine. Where can you learn more? Go to http://kathe-millicent.info/. Enter T503 in the search box to learn more about \"Helping a Person With Alzheimer's Disease: Care Instructions. \"  Current as of: July 26, 2016  Content Version: 11.3  © 7711-5979 [employer]. Care instructions adapted under license by [employer] (which disclaims liability or warranty for this information). If you have questions about a medical condition or this instruction, always ask your healthcare professional. [address] any warranty or liability for your use of this information.

## 2017-06-20 NOTE — MR AVS SNAPSHOT
Visit Information Date & Time Provider Department Dept. Phone Encounter #  
 6/20/2017 10:00 AM Alvino Brock MD [facility] Internal Medicine [facility] [phone] [id number] Upcoming Health Maintenance Date Due DTaP/Tdap/Td series (1 - Tdap) 2/3/1955 Pneumococcal 65+ Low/Medium Risk (1 of 2 - PCV13) 2/3/1999 MEDICARE YEARLY EXAM 11/4/2016 INFLUENZA AGE 9 TO ADULT 8/1/2017 GLAUCOMA SCREENING Q2Y 4/18/2018 Allergies as of 6/20/2017  Review Complete On: 6/20/2017 By: Alvino Brock MD  
  
 Severity Noted Reaction Type Reactions Chocolate [Cocoa]  12/16/2014    Sneezing No longer allergic Namenda [Memantine]  12/16/2014    Shortness of Breath Current Immunizations  Reviewed on 3/12/2015 Name Date Influenza Vaccine 11/1/2014 Zoster Vaccine, Live 12/23/2014 Not reviewed this visit You Were Diagnosed With   
  
 Codes Comments Late onset Alzheimer's disease without behavioral disturbance    -  Primary ICD-10-CM: G30.1, F02.80 ICD-9-CM: 331.0, 294.10 Mixed hyperlipidemia     ICD-10-CM: E78.2 ICD-9-CM: 272.2 Coronary artery disease due to lipid rich plaque     ICD-10-CM: I25.10, I25.83 ICD-9-CM: 414.00, 414.3 Gait abnormality     ICD-10-CM: R26.9 ICD-9-CM: 390. 2 Weakness of both legs     ICD-10-CM: R29.898 ICD-9-CM: 729.89 Vitals BP Pulse Temp Resp Height(growth percentile) Weight(growth percentile) 108/58 (BP 1 Location: Left arm, BP Patient Position: Sitting) 67 98.7 °F (37.1 °C) (Oral) 20 6' 2\" (1.88 m) 172 lb (78 kg) SpO2 BMI Smoking Status 99% 22.08 kg/m2 Former Smoker Vitals History BMI and BSA Data Body Mass Index Body Surface Area 22.08 kg/m 2 2.02 m 2 Preferred Pharmacy Pharmacy Name Phone [facility] [phone] Your Updated Medication List  
  
   
 This list is accurate as of: 6/20/17 10:24 AM.  Always use your most recent med list.  
  
  
  
  
 ALPRAZolam 0.25 mg tablet Commonly known as:  XANAX  
TAKE 1 TABLET BY MOUTH TWO (2) TIMES A DAY. TAKE 1 TABLET AT 8AM AND 1 TABLET AT 12 NOON. aspirin 325 mg tablet Commonly known as:  ASPIRIN Take 325 mg by mouth daily. atenolol 25 mg tablet Commonly known as:  TENORMIN Take 25 mg by mouth daily. donepezil 10 mg tablet Commonly known as:  ARICEPT Take 1 Tab by mouth nightly. escitalopram oxalate 10 mg tablet Commonly known as:  Maudie Phoenix Take 1 Tab by mouth daily. Take 1/2 daily  
  
 food supplemt, lactose-reduced Liqd Commonly known as:  ENSURE Take 237 mL by mouth daily. QUEtiapine 25 mg tablet Commonly known as:  SEROquel Take 1 Tab by mouth nightly. senna-docusate 8.6-50 mg per tablet Commonly known as:  Wava Dun Take 1 Tab by mouth two (2) times daily as needed for Constipation. simvastatin 20 mg tablet Commonly known as:  ZOCOR  
TAKE 1 TABLET BY MOUTH NIGHTLY. STOP ZOCOR 40MG. therapeutic multivitamin tablet Commonly known as:  [facility] Take 1 Tab by mouth daily. We Performed the Following [address] Comments:  
 Please evaluate patient for gait and balance problem Referral Information Referral ID Referred By Referred To  
  
 [id number] Evens Ambrocio Not Available Visits Status Start Date End Date 1 New Request 6/20/17 6/20/18 If your referral has a status of pending review or denied, additional information will be sent to support the outcome of this decision. Patient Instructions Helping a Person With Alzheimer's Disease: Care Instructions Your Care Instructions Alzheimer's disease is a type of dementia. It affects memory, intelligence, judgment, language, and behavior.  It is not clear what causes this disease. But it is the most common form of dementia in older adults. It may take many years to develop. Alzheimer's disease is different than mild memory loss that occurs with aging. Family members usually notice symptoms first. But the person also may realize that something is wrong. Follow-up care is a key part of your loved one's treatment and safety. Be sure to make and go to all appointments, and call your doctor if your loved one is having problems. It's also a good idea to know your loved one's test results and keep a list of the medicines he or she takes. How can you care for your loved one at home? · Develop a routine. The person will feel less frustrated or confused with a clear, simple daily plan. Remind him or her about important facts and events. · Be patient. It may take longer for the person to complete a task than it used to. · Help the person eat a balanced diet. Serve plenty of whole grains, fruits, and vegetables every day. If the person is not eating well at mealtimes, give snacks at midmorning and in the afternoon. Offer drinks such as Boost, Ensure, or Sustacal if he or she is losing weight. · Encourage exercise. Walking and other activity may slow the decline of mental ability. Help the person keep an active mind. Encourage hobbies such as reading and crossword puzzles. · Take steps to help if the person is sundowning. This is the restless behavior and trouble with sleeping that may occur in late afternoon and at night. Try not to let the person nap during the day. Offer a glass of warm milk or caffeine-free tea before bedtime. · Ask family members and friends for help. You may need breaks where others can help care for the person. · Talk to the person's doctor about what resources are available for help in your area. · Review all of the person's medicines with his or her doctor.  
· For as long as the person is able, allow him or her to make decisions about activities, food, clothing, and other choices. Let the person be independent, even if tasks take more time or are not done perfectly. Tailor tasks to the person's abilities. For example, if cooking is no longer safe, ask for other help. He or she can help set the table or make simple dishes such as a salad. When the person needs help, offer it gently. Keeping safe · Make your home (or the person's home) safe. Tack down rugs, and put no-slip tape in the tub. Install handrails, and put safety switches on stoves and appliances. Keep rooms free of clutter. Make sure walkways around furniture are clear. Do not move furniture around, because the person may become confused. · Use locks on doors and cupboards. Lock up knives, scissors, medicines, cleaning supplies, and other dangerous things. · Do not let the person drive or cook if he or she cannot do it safely. A person with Alzheimer's should not drive unless he or she is able to pass an on-road driving test. Your state 's license bureau can do a driving test if there is any question. · Get medical alert jewelry for the person so you can be contacted if he or she wanders away. If possible, provide a safe place for wandering, such as an enclosed yard or garden. When should you call for help? Call 911 anytime you think you may need emergency care. For example, call if: · A person who has Alzheimer's disease has disappeared. · A person who has Alzheimer's disease is seriously injured. Call your doctor now or seek immediate medical care if: · The person you are caring for suddenly sees or hears things that are not there (hallucinates). · The person you are caring for has a sudden, drastic change in his or her behavior. Watch closely for changes in your loved one's health, and be sure to contact the doctor if: · A person who has Alzheimer's disease gradually gets worse or has symptoms that could cause injury. · You need help caring for a person with Alzheimer's disease. · The person has problems with his or her medicine. Where can you learn more? Go to http://kathe-millicent.info/. Enter D200 in the search box to learn more about \"Helping a Person With Alzheimer's Disease: Care Instructions. \" Current as of: July 26, 2016 Content Version: 11.3 © 9680-0048 [employer]. Care instructions adapted under license by [employer] (which disclaims liability or warranty for this information). If you have questions about a medical condition or this instruction, always ask your healthcare professional. [place] [address] any warranty or liability for your use of this information. Introducing [facility]! [facility] introduces [employer] patient portal. Now you can access parts of your medical record, email your doctor's office, and request medication refills online. 1. In your internet browser, go to [email or web address] 2. Click on the First Time User? Click Here link in the Sign In box. You will see the New Member Sign Up page. 3. Enter your [employer] Access Code exactly as it appears below. You will not need to use this code after youve completed the sign-up process. If you do not sign up before the expiration date, you must request a new code. · [employer] Access Code: [email or web address] Expires: 8/2/2017 12:45 PM 
 
4. Enter the last four digits of your Social Security Number (xxxx) and Date of Birth (mm/dd/yyyy) as indicated and click Submit. You will be taken to the next sign-up page. 5. Create a [employer] ID. This will be your [employer] login ID and cannot be changed, so think of one that is secure and easy to remember. 6. Create a [employer] password. You can change your password at any time. 7. Enter your Password Reset Question and Answer. This can be used at a later time if you forget your password. 8. Enter your e-mail address. You will receive e-mail notification when new information is available in [address] 9. Click Sign Up. You can now view and download portions of your medical record. 10. Click the Download Summary menu link to download a portable copy of your medical information. If you have questions, please visit the Frequently Asked Questions section of the [employer] website. Remember, [employer] is NOT to be used for urgent needs. For medical emergencies, dial 911. Now available from your iPhone and Android! Please provide this summary of care documentation to your next provider. Your primary care clinician is listed as Arminda Singh. If you have any questions after today's visit, please call [phone].

## 2017-06-20 NOTE — PROGRESS NOTES
HISTORY OF PRESENT ILLNESS  Regla Cortez is a 80 y.o. male here accompanied by his daughter and care giver. He is doing better since last time. eating 3 meals a day and sleeping well.memory seems better now. able to take aricept. . No agitation,on seroquel. Depression is stable. has memory problem. doing well.will see psych soon. Reports compliance with medications and diet. Med list and most recent labs/studies reviewed with pt. Not active physically to control weight. Needs med refills. Reports no other new c/o. Labs reviewed. Reports B/L leg weakness. no fall.has gait and balance problem. Dementia    His past medical history is significant for hypertension. Cholesterol Problem     Leg Problem      Anxiety     Medication Evaluation         Review of Systems   Constitutional: Negative. Negative for chills and fever. HENT: Negative. Eyes: Negative. Negative for blurred vision, double vision and photophobia. Respiratory: Negative. Cardiovascular: Negative. Gastrointestinal: Negative. Genitourinary: Negative. Musculoskeletal: Negative. Skin: Negative. Neurological: Negative. Endo/Heme/Allergies: Negative. Psychiatric/Behavioral: Positive for depression and memory loss. Negative for substance abuse and suicidal ideas. The patient is nervous/anxious. Physical Exam   Constitutional: He appears well-developed and well-nourished. No distress. Neck: Normal range of motion. Neck supple. No JVD present. No thyromegaly present. Cardiovascular: Normal rate, regular rhythm, normal heart sounds and intact distal pulses. Pulmonary/Chest: Effort normal and breath sounds normal. No respiratory distress. He has no wheezes. He has no rales. Abdominal:   KUB NT   Musculoskeletal: He exhibits no edema or tenderness. Lymphadenopathy:     He has no cervical adenopathy. Psychiatric: He has a normal mood and affect. His behavior is normal.   Depression. demented       ASSESSMENT and Solo Lucas was seen today for annual wellness visit, coronary artery disease, dementia, cholesterol problem and leg problem. Diagnoses and all orders for this visit:    Late onset Alzheimer's disease without behavioral disturbance    tolerating aricept well. Eating all 3 meals. Doing much better. Mixed hyperlipidemia    On zocor. stable. Coronary artery disease due to lipid rich plaque  Had bypass. on ASA. stable. Gait abnormality  Will do,  -     REFERRAL TO HOME HEALTH    Weakness of both legs  -     REFERRAL TO HOME HEALTH      Return if symptoms worsen or fail to improve. Advised him to call back or return to office if symptoms worsen/change/persist.   Discussed expected course/resolution/complications of diagnosis in detail with patient. Medication risks/benefits/costs/interactions/alternatives discussed with patient. He was given an after visit summary which includes diagnoses, current medications, & vitals. He expressed understanding with the diagnosis and plan.

## 2017-06-20 NOTE — PROGRESS NOTES
Health Maintenance Due   Topic Date Due    DTaP/Tdap/Td series (1 - Tdap) 02/03/1955    Pneumococcal 65+ Low/Medium Risk (1 of 2 - PCV13) 02/03/1999    MEDICARE YEARLY EXAM  11/04/2016       Chief Complaint   Patient presents with    Annual Wellness Visit    Coronary Artery Disease     6 week follow up    Dementia    Cholesterol Problem    Leg Problem     states leg weakness and a slight shuffle, wants some PT       1. Have you been to the ER, urgent care clinic since your last visit? Hospitalized since your last visit? No    2. Have you seen or consulted any other health care providers outside of the [address] [place] [address] since your last visit? Include any pap smears or colon screening. No    3) Do you have an Advance Directive on file? no    4) Are you interested in receiving information on Advance Directives? NO      Patient is accompanied by self I have received verbal consent from [address] to discuss any/all medical information while they are present in the room.

## 2017-06-20 NOTE — TELEPHONE ENCOUNTER
Zachary Corona with [facility] called stating she would like to speak with Dr. Leland Cavazos nurse to clarify instructions for Home Health order.  Please advise [phone]

## 2017-06-27 ENCOUNTER — OFFICE VISIT (OUTPATIENT)
Dept: INTERNAL MEDICINE CLINIC | Age: 82
End: 2017-06-27

## 2017-06-27 VITALS
BODY MASS INDEX: 22.14 KG/M2 | WEIGHT: 172.5 LBS | TEMPERATURE: 97.3 F | HEART RATE: 78 BPM | RESPIRATION RATE: 20 BRPM | SYSTOLIC BLOOD PRESSURE: 112 MMHG | DIASTOLIC BLOOD PRESSURE: 64 MMHG | HEIGHT: 74 IN | OXYGEN SATURATION: 98 %

## 2017-06-27 DIAGNOSIS — F03.91 DEMENTIA WITH BEHAVIORAL DISTURBANCE, UNSPECIFIED DEMENTIA TYPE: ICD-10-CM

## 2017-06-27 DIAGNOSIS — S99.921A TOE INJURY, RIGHT, INITIAL ENCOUNTER: Primary | ICD-10-CM

## 2017-06-27 DIAGNOSIS — L97.511 ULCER OF TOE, RIGHT, LIMITED TO BREAKDOWN OF SKIN (HCC): ICD-10-CM

## 2017-06-27 NOTE — PROGRESS NOTES
Health Maintenance Due   Topic Date Due    DTaP/Tdap/Td series (1 - Tdap) 02/03/1955    Pneumococcal 65+ Low/Medium Risk (1 of 2 - PCV13) 02/03/1999    MEDICARE YEARLY EXAM  11/04/2016       Chief Complaint   Patient presents with    Nail Problem     right 2nd toenail raises and bleeding       1. Have you been to the ER, urgent care clinic since your last visit? Hospitalized since your last visit? No    2. Have you seen or consulted any other health care providers outside of the [address] [place] [address] since your last visit? Include any pap smears or colon screening. No    3) Do you have an Advance Directive on file? no    4) Are you interested in receiving information on Advance Directives? NO      Patient is accompanied by self I have received verbal consent from [address] to discuss any/all medical information while they are present in the room.

## 2017-06-27 NOTE — PROGRESS NOTES
HISTORY OF PRESENT ILLNESS  Tereza Alegre is a 80 y.o. male  accompanied by  care giver. He has hanged nail on right middle toe. he was trying to trim nail but toe has started bleeding. bleeding stopped. but his mood is stable and depression is under control. He has adv dementia. on meds. living in assisted living.memory is worse.has behaviour changes too. Nail Problem     Dementia      Follow-up         Review of Systems   Constitutional: Negative. Negative for chills and fever. HENT: Negative. Respiratory: Negative. Gastrointestinal: Negative. Genitourinary: Negative. Musculoskeletal: Negative. Skin:        Bleeding toe nail   Neurological: Negative. Psychiatric/Behavioral: Positive for memory loss. The patient is nervous/anxious. Physical Exam   Constitutional: He appears well-developed and well-nourished. No distress. Neck: Normal range of motion. Neck supple. No JVD present. No thyromegaly present. Cardiovascular: Normal rate, regular rhythm, normal heart sounds and intact distal pulses. Pulmonary/Chest: Effort normal and breath sounds normal. No respiratory distress. He has no wheezes. Abdominal: Soft. Bowel sounds are normal. He exhibits no distension. There is no tenderness. Skin:   Right middle toe:bleeing. indurated nail   Psychiatric: He has a normal mood and affect. His behavior is normal.   Adv dementia       ASSESSMENT and PLAN  Luther Stokes was seen today for nail problem and dementia. Diagnoses and all orders for this visit:    Toe injury, right, initial encounter    Cleaned and bandaged. Will refer,  -     REFERRAL TO PODIATRY    Ulcer of toe, right, limited to breakdown of skin  Cleaned and and bandaged. no infection. Dementia with behavioral disturbance, unspecified dementia type    On med. has caregiver. Stable. Discussed expected course/resolution/complications of diagnosis in detail with patient.    Medication risks/benefits/costs/interactions/alternatives discussed with patient. Pt was given an after visit summary which includes diagnoses, current medications & vitals. Pt expressed understanding with the diagnosis and plan.

## 2017-06-27 NOTE — MR AVS SNAPSHOT
Visit Information Date & Time Provider Department Dept. Phone Encounter #  
 6/27/2017  9:15 AM Ahsan Bangura MD [facility] Internal Medicine [facility] [phone] [id number] Your Appointments 9/19/2017 10:00 AM  
Any with Ahsan Bangura MD  
[facility] ([address]) Appt Note: follow up [address] [phone]  
[phone]  
  
   
 [address] [place] [address]. [address] [place] [phone] Upcoming Health Maintenance Date Due DTaP/Tdap/Td series (1 - Tdap) 2[birth year]3/1955 Pneumococcal 65+ Low/Medium Risk (1 of 2 - PCV13) 2/3/1999 MEDICARE YEARLY EXAM 11/4/2016 INFLUENZA AGE 9 TO ADULT 8/1/2017 GLAUCOMA SCREENING Q2Y 4/18/2018 Allergies as of 6/27/2017  Review Complete On: 6/27/2017 By: Ahsan Bangura MD  
  
 Severity Noted Reaction Type Reactions Chocolate [Cocoa]  12/16/2014    Sneezing No longer allergic Namenda [Memantine]  12/16/2014    Shortness of Breath Current Immunizations  Reviewed on 3/12/2015 Name Date Influenza Vaccine 11/1/2014 Zoster Vaccine, Live 12/23/2014 Not reviewed this visit You Were Diagnosed With   
  
 Codes Comments Toe injury, right, initial encounter    -  Primary ICD-10-CM: Q96.321W ICD-9-CM: 959.7 Ulcer of toe, right, limited to breakdown of skin     ICD-10-CM: L97.511 ICD-9-CM: 707.15 Dementia with behavioral disturbance, unspecified dementia type     ICD-10-CM: F03.91 
ICD-9-CM: 294.21 Vitals BP Pulse Temp Resp Height(growth percentile) Weight(growth percentile) 112/64 (BP 1 Location: Left arm, BP Patient Position: Sitting) 78 97.3 °F (36.3 °C) (Oral) 20 6' 2\" (1.88 m) 172 lb 8 oz (78.2 kg) SpO2 BMI Smoking Status 98% 22.15 kg/m2 Former Smoker Vitals History BMI and BSA Data Body Mass Index Body Surface Area  
 22.15 kg/m 2 2.02 m 2 Preferred Pharmacy Pharmacy Name Phone [facility] [phone] Your Updated Medication List  
  
   
This list is accurate as of: 6/27/17  9:45 AM.  Always use your most recent med list.  
  
  
  
  
 ALPRAZolam 0.25 mg tablet Commonly known as:  XANAX  
TAKE 1 TABLET BY MOUTH TWO (2) TIMES A DAY. TAKE 1 TABLET AT 8AM AND 1 TABLET AT 12 NOON. aspirin 325 mg tablet Commonly known as:  ASPIRIN Take 325 mg by mouth daily. atenolol 25 mg tablet Commonly known as:  TENORMIN Take 25 mg by mouth daily. donepezil 10 mg tablet Commonly known as:  ARICEPT Take 1 Tab by mouth nightly. escitalopram oxalate 10 mg tablet Commonly known as:  Lelon Bible Take 1 Tab by mouth daily. Take 1/2 daily  
  
 food supplemt, lactose-reduced Liqd Commonly known as:  ENSURE Take 237 mL by mouth daily. QUEtiapine 25 mg tablet Commonly known as:  SEROquel Take 1 Tab by mouth nightly. senna-docusate 8.6-50 mg per tablet Commonly known as:  Mary Pal Take 1 Tab by mouth two (2) times daily as needed for Constipation. simvastatin 20 mg tablet Commonly known as:  ZOCOR  
TAKE 1 TABLET BY MOUTH NIGHTLY. STOP ZOCOR 40MG. therapeutic multivitamin tablet Commonly known as:  [facility] Take 1 Tab by mouth daily. We Performed the Following REFERRAL TO PODIATRY [REF90 Custom] Comments:  
 Please evaluate patient for toe injury Referral Information Referral ID Referred By Referred To  
  
 [id number] ALI, [address] [facility] Visits Status Start Date End Date 1 New Request 6/27/17 6/27/18 If your referral has a status of pending review or denied, additional information will be sent to support the outcome of this decision. Introducing [facility]! Jenny [facility] introduces [employer] patient portal. Now you can access parts of your medical record, email your doctor's office, and request medication refills online. 1. In your internet browser, go to [email or web address] 2. Click on the First Time User? Click Here link in the Sign In box. You will see the New Member Sign Up page. 3. Enter your [employer] Access Code exactly as it appears below. You will not need to use this code after youve completed the sign-up process. If you do not sign up before the expiration date, you must request a new code. · [employer] Access Code: [email or web address] Expires: 8/2/2017 12:45 PM 
 
4. Enter the last four digits of your Social Security Number (xxxx) and Date of Birth (mm/dd/yyyy) as indicated and click Submit. You will be taken to the next sign-up page. 5. Create a [employer] ID. This will be your [employer] login ID and cannot be changed, so think of one that is secure and easy to remember. 6. Create a [employer] password. You can change your password at any time. 7. Enter your Password Reset Question and Answer. This can be used at a later time if you forget your password. 8. Enter your e-mail address. You will receive e-mail notification when new information is available in [address] 9. Click Sign Up. You can now view and download portions of your medical record. 10. Click the Download Summary menu link to download a portable copy of your medical information. If you have questions, please visit the Frequently Asked Questions section of the [employer] website. Remember, [employer] is NOT to be used for urgent needs. For medical emergencies, dial 911. Now available from your iPhone and Android! Please provide this summary of care documentation to your next provider. Your primary care clinician is listed as Brandsvillemassiel Ashley. If you have any questions after today's visit, please call [phone].

## 2017-07-06 DIAGNOSIS — F41.9 ANXIETY: ICD-10-CM

## 2017-07-06 RX ORDER — ALPRAZOLAM 0.25 MG/1
0.25 TABLET ORAL
Qty: 60 TAB | Refills: 0 | Status: SHIPPED | OUTPATIENT
Start: 2017-07-06 | End: 2017-08-03 | Stop reason: SDUPTHER

## 2017-08-03 DIAGNOSIS — R45.1 AGITATION: ICD-10-CM

## 2017-08-03 DIAGNOSIS — R45.1 AGITATION: Primary | ICD-10-CM

## 2017-08-03 DIAGNOSIS — F41.9 ANXIETY: ICD-10-CM

## 2017-08-03 RX ORDER — ALPRAZOLAM 0.25 MG/1
TABLET ORAL
Qty: 15 TAB | Refills: 0 | Status: SHIPPED | OUTPATIENT
Start: 2017-08-03 | End: 2017-08-03 | Stop reason: ALTCHOICE

## 2017-08-03 RX ORDER — ALPRAZOLAM 0.25 MG/1
0.25 TABLET ORAL
Qty: 75 TAB | Refills: 0 | Status: SHIPPED | OUTPATIENT
Start: 2017-08-03 | End: 2017-09-05 | Stop reason: SDUPTHER

## 2017-08-03 RX ORDER — ALPRAZOLAM 0.25 MG/1
0.25 TABLET ORAL
Qty: 60 TAB | Refills: 0 | Status: SHIPPED | OUTPATIENT
Start: 2017-08-03 | End: 2017-08-03 | Stop reason: ALTCHOICE

## 2017-08-03 RX ORDER — ALPRAZOLAM 0.25 MG/1
TABLET ORAL
Qty: 15 TAB | Refills: 0 | Status: SHIPPED | OUTPATIENT
Start: 2017-08-03 | End: 2017-08-03 | Stop reason: SDUPTHER

## 2017-08-14 DIAGNOSIS — F03.91 DEMENTIA WITH BEHAVIORAL DISTURBANCE, UNSPECIFIED DEMENTIA TYPE: ICD-10-CM

## 2017-08-14 DIAGNOSIS — F22 PARANOIA (HCC): ICD-10-CM

## 2017-08-14 RX ORDER — QUETIAPINE FUMARATE 25 MG/1
TABLET, FILM COATED ORAL
Qty: 30 TAB | Refills: 3 | Status: SHIPPED | OUTPATIENT
Start: 2017-08-14

## 2017-08-18 ENCOUNTER — OFFICE VISIT (OUTPATIENT)
Dept: INTERNAL MEDICINE CLINIC | Age: 82
End: 2017-08-18

## 2017-08-18 VITALS
BODY MASS INDEX: 22.07 KG/M2 | WEIGHT: 172 LBS | DIASTOLIC BLOOD PRESSURE: 46 MMHG | OXYGEN SATURATION: 97 % | HEART RATE: 67 BPM | TEMPERATURE: 97.2 F | SYSTOLIC BLOOD PRESSURE: 107 MMHG | HEIGHT: 74 IN | RESPIRATION RATE: 16 BRPM

## 2017-08-18 DIAGNOSIS — Z00.00 MEDICARE ANNUAL WELLNESS VISIT, SUBSEQUENT: Primary | ICD-10-CM

## 2017-08-18 NOTE — PROGRESS NOTES
HISTORY OF PRESENT ILLNESS  Steve Boateng is a 80 y.o. male and presents for annual Medicare Wellness Visit. The patient presents today with caregiver. He states that he is feeling well. He denies pain. Problem List: Reviewed with patient and discussed risk factors. 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    Leg weakness, bilateral R29.898    Hot flashes R23.2    Mixed hyperlipidemia E78.2    Depression F32.9    Advanced care planning/counseling discussion Z71.89    Agitation R45.1    Constipation K59.00       Current medical providers:  Patient Care Team:  Brandie Franz MD as PCP - General (Internal Medicine)  Drake Garcia MD (Inactive) (Ophthalmology)  Andrea Garsia MD (Dermatology)  Arvind Robles MD (Inactive) (Cardiology)  Houston Enciso MD (Psychiatry)  Nano Naidu RN as Nurse Navigator  Javon Tao LPN    PSH: Reviewed with patient  Past Surgical History:   Procedure Laterality Date    CARDIAC SURG PROCEDURE UNLIST      HX AORTIC VALVE REPLACEMENT      HX CHOLECYSTECTOMY      HX CORONARY ARTERY BYPASS GRAFT      HX CORONARY STENT PLACEMENT      HX HEART VALVE SURGERY      HX ORTHOPAEDIC      HX VASECTOMY          SH: Reviewed with patient  Social History   Substance Use Topics    Smoking status: Former Smoker     Quit date: 1/16/1945    Smokeless tobacco: Never Used    Alcohol use No      Comment: Occasionally       FH: Reviewed with patient  Family History   Problem Relation Age of Onset    Stroke Mother     Heart Disease Mother     Diabetes Father        Medications/Allergies: Reviewed with patient  Current Outpatient Prescriptions on File Prior to Visit   Medication Sig Dispense Refill    QUEtiapine (SEROQUEL) 25 mg tablet TAKE 1 TABLET BY MOUTH NIGHLY.  30 Tab 3    ALPRAZolam (XANAX) 0.25 mg tablet Take 1 Tab by mouth three (3) times daily as needed for Anxiety (1 tablet PO BID and 1 tablet PRN for severy anxiety). Max Daily Amount: 0.75 mg. 75 Tab 0    simvastatin (ZOCOR) 20 mg tablet TAKE 1 TABLET BY MOUTH NIGHTLY. STOP ZOCOR 40MG. 30 Tab 5    senna-docusate (PERICOLACE) 8.6-50 mg per tablet Take 1 Tab by mouth two (2) times daily as needed for Constipation. 60 Tab 11    donepezil (ARICEPT) 10 mg tablet Take 1 Tab by mouth nightly. 30 Tab 11    escitalopram oxalate (LEXAPRO) 10 mg tablet Take 1 Tab by mouth daily. Take 1/2 daily (Patient taking differently: Take 5 mg by mouth daily. Take 1/2 daily ) 30 Tab 3    food supplemt, lactose-reduced (ENSURE) liqd Take 237 mL by mouth daily. 30 Can 3    therapeutic multivitamin (THERAGRAN) tablet Take 1 Tab by mouth daily.  atenolol (TENORMIN) 25 mg tablet Take 25 mg by mouth daily.  aspirin (ASPIRIN) 325 mg tablet Take 325 mg by mouth daily. No current facility-administered medications on file prior to visit. Allergies   Allergen Reactions    Namenda [Memantine] Shortness of Breath       Objective:  Visit Vitals    /46 (BP 1 Location: Left arm, BP Patient Position: Sitting)    Pulse 67    Temp 97.2 °F (36.2 °C) (Oral)    Resp 16    Ht 6' 2\" (1.88 m)    Wt 172 lb (78 kg)    SpO2 97%    BMI 22.08 kg/m2    Body mass index is 22.08 kg/(m^2). Assessment of cognitive impairment: Alert and oriented to self    Depression Screen:   PHQ over the last two weeks 8/18/2017   Little interest or pleasure in doing things Not at all   Feeling down, depressed or hopeless Not at all   Total Score PHQ 2 0       Fall Risk Assessment:    Fall Risk Assessment, last 12 mths 8/18/2017   Able to walk? Yes   Fall in past 12 months? No   Fall with injury? -   Number of falls in past 12 months -   Fall Risk Score -       Functional Ability:   Does the patient exhibit a steady gait?  yes   Is the patient self reliant?  (ie can do own laundry, meals, household chores) No, requires assistance with bathing, chores, meals, transportation      Does the patient handle his/her own medications? no     Does the patient handle his/her own money? no     Is the patients home safe (ie good lighting, handrails on stairs and bath, etc.)? yes     Did you notice or did patient express any hearing difficulties? no     Did you notice or did patient express any vision difficulties? no       Advance Care Planning:   Patient was offered the opportunity to discuss advance care planning: yes   Does patient have an Advance Directive: asked to provide at next visit if available     HPI    Review of Systems   Constitutional: Negative for fever. Respiratory: Negative for cough and shortness of breath. Cardiovascular: Negative for chest pain and leg swelling. Gastrointestinal: Negative for abdominal pain. Musculoskeletal: Negative. Skin: Negative. Neurological: Negative for headaches. Physical Exam   Constitutional: He appears well-developed and well-nourished. No distress. HENT:   Head: Normocephalic and atraumatic. Cardiovascular: Normal rate and regular rhythm. Pulmonary/Chest: Effort normal and breath sounds normal. No respiratory distress. He has no wheezes. He has no rales. Abdominal: Soft. Bowel sounds are normal. He exhibits no distension. There is no tenderness. Musculoskeletal: He exhibits no edema. Neurological: He is alert. Oriented to self and [place], [address]  Equal  bilaterally   Skin: Skin is warm and dry. Nursing note and vitals reviewed. ASSESSMENT and PLAN  Encounter Diagnoses   Name Primary?  Medicare annual wellness visit, subsequent Yes   Discussed health and wellness as appropriate based on age and past medical history, including recommended screening exams and the importance of diet and exercise.     Lab results and schedule of future lab studies reviewed with patient  Reviewed medications and side effects in detail Schedule of Personalized Health Plan  (Provide Copy to Patient)  The best way to stay healthy is to live a healthy lifestyle. A healthy lifestyle includes regular exercise, eating a well-balanced diet, keeping a healthy weight and not smoking. Regular physical exams and screening tests are another important way to take care of yourself. Preventive exams provided by health care providers can find health problems early when treatment works best and can keep you from getting certain diseases or illnesses. Preventive services include exams, lab tests, screenings, shots, monitoring and information to help you take care of your own health. All people over 65 should have a pneumonia shot. Pneumonia shots are usually only needed once in a lifetime unless your doctor decides differently. Patient unsure if he has received pneumonia vaccination. Please provide date of vaccine, if available. All people over 65 should have a yearly flu shot. In addition to your physical exam, some screening tests are recommended:    Diabetes Mellitus screening is recommended every year. HgbA1c was 5.8 in April 2017. This is within pre-diabetic range. Watch diet for sugars and carbohydrates. Glaucoma is an eye disease caused by high pressure in the eye. An eye exam is recommended every year. Unsure of date of last eye exam at today's visit. Please provide documentation of last eye exam if available. Please provide advanced medical directive if available. *Patient verbalized understanding and agreement with the plan. A copy of the After Visit Summary with personalized health plan was given to the patient today.

## 2017-08-18 NOTE — MR AVS SNAPSHOT
Visit Information Date & Time Provider Department Dept. Phone Encounter #  
 8/18/2017 10:00 AM Mary Lou Young, [address] [facility] Internal Medicine [facility] [phone] [id number] Your Appointments 9/19/2017 10:00 AM  
Any with Kevan Bowman MD  
[facility] ([address]) Appt Note: follow up [address] [phone]  
  
   
 [address] [place] [address]. [address] [place] [phone] Upcoming Health Maintenance Date Due DTaP/Tdap/Td series (1 - Tdap) 2/3/1955 Pneumococcal 65+ Low/Medium Risk (1 of 2 - PCV13) 2/3/1999 INFLUENZA AGE 9 TO ADULT 8/1/2017 GLAUCOMA SCREENING Q2Y 4/18/2018 MEDICARE YEARLY EXAM 8/19/2018 Allergies as of 8/18/2017  Review Complete On: 8/18/2017 By: Mary Lou Young NP Severity Noted Reaction Type Reactions Namenda [Memantine]  12/16/2014    Shortness of Breath Current Immunizations  Reviewed on 3/12/2015 Name Date Influenza Vaccine 11/1/2014 Zoster Vaccine, Live 12/23/2014 Not reviewed this visit Vitals BP Pulse Temp Resp Height(growth percentile) Weight(growth percentile) 107/46 (BP 1 Location: Left arm, BP Patient Position: Sitting) 67 97.2 °F (36.2 °C) (Oral) 16 6' 2\" (1.88 m) 172 lb (78 kg) SpO2 BMI Smoking Status 97% 22.08 kg/m2 Former Smoker Vitals History BMI and BSA Data Body Mass Index Body Surface Area 22.08 kg/m 2 2.02 m 2 Preferred Pharmacy Pharmacy Name Phone [facility] [phone] Your Updated Medication List  
  
   
This list is accurate as of: 8/18/17 10:32 AM.  Always use your most recent med list.  
  
  
  
  
 ALPRAZolam 0.25 mg tablet Commonly known as:  Jestine Pies Take 1 Tab by mouth three (3) times daily as needed for Anxiety (1 tablet PO BID and 1 tablet PRN for severy anxiety). Max Daily Amount: 0.75 mg.  
  
 aspirin 325 mg tablet Commonly known as:  ASPIRIN Take 325 mg by mouth daily. atenolol 25 mg tablet Commonly known as:  TENORMIN Take 25 mg by mouth daily. donepezil 10 mg tablet Commonly known as:  ARICEPT Take 1 Tab by mouth nightly. escitalopram oxalate 10 mg tablet Commonly known as:  Renetta Bors Take 1 Tab by mouth daily. Take 1/2 daily  
  
 food supplemt, lactose-reduced Liqd Commonly known as:  ENSURE Take 237 mL by mouth daily. QUEtiapine 25 mg tablet Commonly known as:  SEROquel TAKE 1 TABLET BY MOUTH NIGHLY. senna-docusate 8.6-50 mg per tablet Commonly known as:  Herb Fought Take 1 Tab by mouth two (2) times daily as needed for Constipation. simvastatin 20 mg tablet Commonly known as:  ZOCOR  
TAKE 1 TABLET BY MOUTH NIGHTLY. STOP ZOCOR 40MG. therapeutic multivitamin tablet Commonly known as:  [facility] Take 1 Tab by mouth daily. Patient Instructions Schedule of Personalized Health Plan (Provide Copy to Patient) The best way to stay healthy is to live a healthy lifestyle. A healthy lifestyle includes regular exercise, eating a well-balanced diet, keeping a healthy weight and not smoking. Regular physical exams and screening tests are another important way to take care of yourself. Preventive exams provided by health care providers can find health problems early when treatment works best and can keep you from getting certain diseases or illnesses. Preventive services include exams, lab tests, screenings, shots, monitoring and information to help you take care of your own health. All people over 65 should have a pneumonia shot. Pneumonia shots are usually only needed once in a lifetime unless your doctor decides differently. Patient unsure if he has received pneumonia vaccination. Please provide date of vaccine, if available. All people over 65 should have a yearly flu shot. In addition to your physical exam, some screening tests are recommended: 
 
Diabetes Mellitus screening is recommended every year. HgbA1c was 5.8 in April 2017. This is within pre-diabetic range. Watch diet for sugars and carbohydrates. Glaucoma is an eye disease caused by high pressure in the eye. An eye exam is recommended every year. Unsure of date of last eye exam at today's visit. Please provide documentation of last eye exam if available. Please provide advanced medical directive if available. Please provide this summary of care documentation to your next provider. Your primary care clinician is listed as Courtney Mcclain. If you have any questions after today's visit, please call [phone].

## 2017-08-18 NOTE — PATIENT INSTRUCTIONS
Schedule of Personalized Health Plan  (Provide Copy to Patient)  The best way to stay healthy is to live a healthy lifestyle. A healthy lifestyle includes regular exercise, eating a well-balanced diet, keeping a healthy weight and not smoking. Regular physical exams and screening tests are another important way to take care of yourself. Preventive exams provided by health care providers can find health problems early when treatment works best and can keep you from getting certain diseases or illnesses. Preventive services include exams, lab tests, screenings, shots, monitoring and information to help you take care of your own health. All people over 65 should have a pneumonia shot. Pneumonia shots are usually only needed once in a lifetime unless your doctor decides differently. Patient unsure if he has received pneumonia vaccination. Please provide date of vaccine, if available. All people over 65 should have a yearly flu shot. In addition to your physical exam, some screening tests are recommended:    Diabetes Mellitus screening is recommended every year. HgbA1c was 5.8 in April 2017. This is within pre-diabetic range. Watch diet for sugars and carbohydrates. Glaucoma is an eye disease caused by high pressure in the eye. An eye exam is recommended every year. Unsure of date of last eye exam at today's visit. Please provide documentation of last eye exam if available. Please provide advanced medical directive if available.

## 2017-08-18 NOTE — PROGRESS NOTES
Chief Complaint   Patient presents with   [facility] Annual Wellness Visit   1. Have you been to the ER, urgent care clinic since your last visit? Hospitalized since your last visit? No    2. Have you seen or consulted any other health care providers outside of the [address] [place] [address] since your last visit? Include any pap smears or colon screening.  No

## 2017-09-19 ENCOUNTER — OFFICE VISIT (OUTPATIENT)
Dept: INTERNAL MEDICINE CLINIC | Age: 82
End: 2017-09-19

## 2017-09-19 ENCOUNTER — HOSPITAL ENCOUNTER (OUTPATIENT)
Dept: LAB | Age: 82
Discharge: HOME | End: 2017-09-19
Payer: MEDICARE

## 2017-09-19 VITALS
TEMPERATURE: 97.4 F | HEART RATE: 65 BPM | RESPIRATION RATE: 20 BRPM | OXYGEN SATURATION: 97 % | HEIGHT: 74 IN | DIASTOLIC BLOOD PRESSURE: 69 MMHG | SYSTOLIC BLOOD PRESSURE: 124 MMHG | WEIGHT: 168 LBS | BODY MASS INDEX: 21.56 KG/M2

## 2017-09-19 DIAGNOSIS — R41.0 CONFUSION: Primary | ICD-10-CM

## 2017-09-19 DIAGNOSIS — I25.83 CORONARY ARTERY DISEASE DUE TO LIPID RICH PLAQUE: ICD-10-CM

## 2017-09-19 DIAGNOSIS — H61.22 IMPACTED CERUMEN OF LEFT EAR: ICD-10-CM

## 2017-09-19 DIAGNOSIS — F32.A DEPRESSION, UNSPECIFIED DEPRESSION TYPE: ICD-10-CM

## 2017-09-19 DIAGNOSIS — I25.10 CORONARY ARTERY DISEASE DUE TO LIPID RICH PLAQUE: ICD-10-CM

## 2017-09-19 DIAGNOSIS — F03.91 DEMENTIA WITH BEHAVIORAL DISTURBANCE, UNSPECIFIED DEMENTIA TYPE: ICD-10-CM

## 2017-09-19 DIAGNOSIS — R73.02 IMPAIRED GLUCOSE TOLERANCE: ICD-10-CM

## 2017-09-19 DIAGNOSIS — F41.8 ANXIETY WITH DEPRESSION: ICD-10-CM

## 2017-09-19 DIAGNOSIS — E55.9 VITAMIN D DEFICIENCY: ICD-10-CM

## 2017-09-19 PROCEDURE — 80053 COMPREHEN METABOLIC PANEL: CPT

## 2017-09-19 PROCEDURE — 83036 HEMOGLOBIN GLYCOSYLATED A1C: CPT

## 2017-09-19 PROCEDURE — 82306 VITAMIN D 25 HYDROXY: CPT

## 2017-09-19 PROCEDURE — 85025 COMPLETE CBC W/AUTO DIFF WBC: CPT

## 2017-09-19 RX ORDER — ESCITALOPRAM OXALATE 10 MG/1
10 TABLET ORAL DAILY
Qty: 30 TAB | Refills: 3 | Status: SHIPPED | OUTPATIENT
Start: 2017-09-19 | End: 2018-05-18

## 2017-09-19 NOTE — PROGRESS NOTES
Health Maintenance Due   Topic Date Due    DTaP/Tdap/Td series (1 - Tdap) 02/03/1955    Pneumococcal 65+ Low/Medium Risk (1 of 2 - PCV13) 02/03/1999    INFLUENZA AGE 9 TO ADULT  08/01/2017       Chief Complaint   Patient presents with    Coronary Artery Disease     3 month follow up    Dementia    Cholesterol Problem    Depression    Abdominal Pain     c/o abd pain, r/o UTI    Wax in Ear       1. Have you been to the ER, urgent care clinic since your last visit? Hospitalized since your last visit? No    2. Have you seen or consulted any other health care providers outside of the [address] [place] [address] since your last visit? Include any pap smears or colon screening. No    3) Do you have an Advance Directive on file? no    4) Are you interested in receiving information on Advance Directives? NO      Patient is accompanied by daughter I have received verbal consent from [address] to discuss any/all medical information while they are present in the room.

## 2017-09-19 NOTE — MR AVS SNAPSHOT
Visit Information Date & Time Provider Department Dept. Phone Encounter #  
 9/19/2017 10:00 AM Mae Post MD [facility] Internal Medicine [facility] [phone] [id number] Upcoming Health Maintenance Date Due DTaP/Tdap/Td series (1 - Tdap) 2/3/1955 Pneumococcal 65+ Low/Medium Risk (1 of 2 - PCV13) 2/3/1999 INFLUENZA AGE 9 TO ADULT 8/1/2017 GLAUCOMA SCREENING Q2Y 4/18/2018 MEDICARE YEARLY EXAM 8/19/2018 Allergies as of 9/19/2017  Review Complete On: 9/19/2017 By: Mae Post MD  
  
 Severity Noted Reaction Type Reactions Namenda [Memantine]  12/16/2014    Shortness of Breath Current Immunizations  Reviewed on 3/12/2015 Name Date Influenza Vaccine 11/1/2014 Zoster Vaccine, Live 12/23/2014 Not reviewed this visit You Were Diagnosed With   
  
 Codes Comments Confusion    -  Primary ICD-10-CM: R41.0 ICD-9-CM: 298.9 Coronary artery disease due to lipid rich plaque     ICD-10-CM: I25.10, I25.83 ICD-9-CM: 414.00, 414.3 Dementia with behavioral disturbance, unspecified dementia type     ICD-10-CM: F03.91 
ICD-9-CM: 294.21 Anxiety with depression     ICD-10-CM: F41.8 ICD-9-CM: 300.4 Depression, unspecified depression type     ICD-10-CM: F32.9 ICD-9-CM: 916 Vitamin D deficiency     ICD-10-CM: E55.9 ICD-9-CM: 268.9 Impaired glucose tolerance     ICD-10-CM: R73.02 
ICD-9-CM: 790.22 Impacted cerumen of left ear     ICD-10-CM: H61.22 
ICD-9-CM: 380.4 Vitals BP Pulse Temp Resp Height(growth percentile) Weight(growth percentile) 124/69 (BP 1 Location: Left arm, BP Patient Position: Sitting) 65 97.4 °F (36.3 °C) (Oral) 20 6' 2\" (1.88 m) 168 lb (76.2 kg) SpO2 BMI Smoking Status 97% 21.57 kg/m2 Former Smoker Vitals History BMI and BSA Data Body Mass Index Body Surface Area  
 21.57 kg/m 2 1.99 m 2 Preferred Pharmacy Pharmacy Name Phone [facility] [phone] Your Updated Medication List  
  
   
This list is accurate as of: 9/19/17 11:18 AM.  Always use your most recent med list.  
  
  
  
  
 ALPRAZolam 0.25 mg tablet Commonly known as:  XANAX  
TAKE 1 TABLET BY MOUTH THREE TIMES A DAY AS NEEDED (1 TABLET TWICE A DAY AND 1 TABLET IF NEEDED FOR SEVERE ANXIETY). aspirin 325 mg tablet Commonly known as:  ASPIRIN Take 325 mg by mouth daily. atenolol 25 mg tablet Commonly known as:  TENORMIN Take 25 mg by mouth daily. donepezil 10 mg tablet Commonly known as:  ARICEPT Take 1 Tab by mouth nightly. escitalopram oxalate 10 mg tablet Commonly known as:  Renetta Bors Take 1 Tab by mouth daily. food supplemt, lactose-reduced Liqd Commonly known as:  ENSURE Take 237 mL by mouth daily. QUEtiapine 25 mg tablet Commonly known as:  SEROquel TAKE 1 TABLET BY MOUTH NIGHLY. senna-docusate 8.6-50 mg per tablet Commonly known as:  Herb Fought Take 1 Tab by mouth two (2) times daily as needed for Constipation. simvastatin 20 mg tablet Commonly known as:  ZOCOR  
TAKE 1 TABLET BY MOUTH NIGHTLY. STOP ZOCOR 40MG. therapeutic multivitamin tablet Commonly known as:  [facility] Take 1 Tab by mouth daily. Prescriptions Sent to Pharmacy Refills  
 escitalopram oxalate (LEXAPRO) 10 mg tablet 3 Sig: Take 1 Tab by mouth daily. Class: Normal  
 Pharmacy: [address] Ph #: [phone] Route: Oral  
  
We Performed the Following CBC WITH AUTOMATED DIFF [71435 CPT(R)] HEMOGLOBIN A1C WITH EAG [30913 CPT(R)] METABOLIC PANEL, COMPREHENSIVE [05321 CPT(R)] VITAMIN D, 25 HYDROXY [id number] CPT(R)] Please provide this summary of care documentation to your next provider. Your primary care clinician is listed as Lc Gann.  If you have any questions after today's visit, please call [phone].

## 2017-09-19 NOTE — PROGRESS NOTES
HISTORY OF PRESENT ILLNESS  Rm Duarte is a 80 y.o. male  accompanied by  care giver and daughter. Remains confused. no dysuria or flank pain. often.has advance dementia. able to eat but get angry often.has paranoid ideation but not severe. Has depression ,on med.using anxiety med often too. Reports hearing loss. no earache. Labs reviewed. Dementia      Cholesterol Problem   Associated symptoms include abdominal pain. Depression   Associated symptoms include abdominal pain. Wax in Ear   Associated symptoms include abdominal pain. Review of Systems   Constitutional: Negative. Negative for chills and fever. HENT: Positive for hearing loss. Respiratory: Negative. Gastrointestinal: Positive for abdominal pain. Genitourinary: Negative. Musculoskeletal: Negative. Skin:        Bleeding toe nail   Neurological: Negative. Psychiatric/Behavioral: Positive for depression and memory loss. The patient is nervous/anxious. Physical Exam   Constitutional: He appears well-developed and well-nourished. No distress. HENT:   Head: Normocephalic and atraumatic. Nose: Nose normal.   Mouth/Throat: Oropharynx is clear and moist.   Wax in both ear canal.more on left   Neck: Normal range of motion. Neck supple. No JVD present. No thyromegaly present. Cardiovascular: Normal rate, regular rhythm, normal heart sounds and intact distal pulses. Pulmonary/Chest: Effort normal and breath sounds normal. No respiratory distress. He has no wheezes. Abdominal: Soft. Bowel sounds are normal. He exhibits no distension. There is no tenderness. Skin:   Right middle toe:bleeing. indurated nail   Psychiatric: He has a normal mood and affect. His behavior is normal.   Adv dementia. depressed and angry,some paranoid ideation. ASSESSMENT and PLAN  Diagnoses and all orders for this visit:    1. Confusion  Probably due to adv dementia.   Will do,  -     METABOLIC PANEL, COMPREHENSIVE  -     CBC WITH AUTOMATED DIFF  Will check UA too. pt is unable to void now. sterile cup is given for sample. 2. Coronary artery disease due to lipid rich plaque  On med. Will do,  -     METABOLIC PANEL, COMPREHENSIVE  -     CBC WITH AUTOMATED DIFF    3. Dementia with behavioral disturbance, unspecified dementia type  Advanced. On .aricept.not able to tolerate namenda or exelon patch. Care giver with him. 4. Anxiety with depression  On med. 5. Depression, unspecified depression type  Get angry often. Will increase,  -     escitalopram oxalate (LEXAPRO) 10 mg tablet; Take 1 Tab by mouth daily. 6. Vitamin D deficiency  -     VITAMIN D, 25 HYDROXY    7. Impaired glucose tolerance  -     HEMOGLOBIN A1C WITH EAG    8. Impacted cerumen of left ear    Adv to use peroxide TID for 1 week. Discussed expected course/resolution/complications of diagnosis in detail with patient. Medication risks/benefits/costs/interactions/alternatives discussed with patient. Pt was given an after visit summary which includes diagnoses, current medications & vitals. Pt expressed understanding with the diagnosis and plan.

## 2017-09-20 LAB
25(OH)D3+25(OH)D2 SERPL-MCNC: 40.3 NG/ML (ref 30–100)
ALBUMIN SERPL-MCNC: 4.2 G/DL (ref 3.5–4.7)
ALBUMIN/GLOB SERPL: 1.6 {RATIO} (ref 1.2–2.2)
ALP SERPL-CCNC: 64 IU/L (ref 39–117)
ALT SERPL-CCNC: 16 IU/L (ref 0–44)
AST SERPL-CCNC: 19 IU/L (ref 0–40)
BASOPHILS # BLD AUTO: 0 X10E3/UL (ref 0–0.2)
BASOPHILS NFR BLD AUTO: 0 %
BILIRUB SERPL-MCNC: 1.1 MG/DL (ref 0–1.2)
BUN SERPL-MCNC: 23 MG/DL (ref 8–27)
BUN/CREAT SERPL: 23 (ref 10–24)
CALCIUM SERPL-MCNC: 9.2 MG/DL (ref 8.6–10.2)
CHLORIDE SERPL-SCNC: 103 MMOL/L (ref 96–106)
CO2 SERPL-SCNC: 27 MMOL/L (ref 18–29)
CREAT SERPL-MCNC: 0.98 MG/DL (ref 0.76–1.27)
EOSINOPHIL # BLD AUTO: 0.2 X10E3/UL (ref 0–0.4)
EOSINOPHIL NFR BLD AUTO: 1 %
ERYTHROCYTE [DISTWIDTH] IN BLOOD BY AUTOMATED COUNT: 14.7 % (ref 12.3–15.4)
EST. AVERAGE GLUCOSE BLD GHB EST-MCNC: 100 MG/DL
GLOBULIN SER CALC-MCNC: 2.7 G/DL (ref 1.5–4.5)
GLUCOSE SERPL-MCNC: 93 MG/DL (ref 65–99)
HBA1C MFR BLD: 5.1 % (ref 4.8–5.6)
HCT VFR BLD AUTO: 40.4 % (ref 37.5–51)
HGB BLD-MCNC: 13.2 G/DL (ref 12.6–17.7)
IMM GRANULOCYTES # BLD: 0 X10E3/UL (ref 0–0.1)
IMM GRANULOCYTES NFR BLD: 0 %
LYMPHOCYTES # BLD AUTO: 1.5 X10E3/UL (ref 0.7–3.1)
LYMPHOCYTES NFR BLD AUTO: 12 %
MCH RBC QN AUTO: 32.3 PG (ref 26.6–33)
MCHC RBC AUTO-ENTMCNC: 32.7 G/DL (ref 31.5–35.7)
MCV RBC AUTO: 99 FL (ref 79–97)
MONOCYTES # BLD AUTO: 1.2 X10E3/UL (ref 0.1–0.9)
MONOCYTES NFR BLD AUTO: 10 %
NEUTROPHILS # BLD AUTO: 9.4 X10E3/UL (ref 1.4–7)
NEUTROPHILS NFR BLD AUTO: 77 %
PLATELET # BLD AUTO: 204 X10E3/UL (ref 150–379)
POTASSIUM SERPL-SCNC: 4.6 MMOL/L (ref 3.5–5.2)
PROT SERPL-MCNC: 6.9 G/DL (ref 6–8.5)
RBC # BLD AUTO: 4.09 X10E6/UL (ref 4.14–5.8)
SODIUM SERPL-SCNC: 145 MMOL/L (ref 134–144)
WBC # BLD AUTO: 12.3 X10E3/UL (ref 3.4–10.8)

## 2017-09-26 DIAGNOSIS — R41.0 CONFUSION: ICD-10-CM

## 2017-09-26 DIAGNOSIS — D72.829 LEUKOCYTOSIS, UNSPECIFIED TYPE: Primary | ICD-10-CM

## 2017-09-26 NOTE — PROGRESS NOTES
Writer spoke with [facility] and made aware that it was important to have his urine obtained and stated they would bring tomorrow

## 2017-09-29 LAB — BACTERIA UR CULT: ABNORMAL

## 2017-10-02 DIAGNOSIS — N30.90 CYSTITIS: Primary | ICD-10-CM

## 2017-10-02 RX ORDER — NITROFURANTOIN 25; 75 MG/1; MG/1
100 CAPSULE ORAL 2 TIMES DAILY
Qty: 10 CAP | Refills: 0 | Status: SHIPPED | OUTPATIENT
Start: 2017-10-02 | End: 2017-10-17 | Stop reason: ALTCHOICE

## 2017-10-03 DIAGNOSIS — F41.0 ANXIETY ATTACK: Primary | ICD-10-CM

## 2017-10-03 RX ORDER — ALPRAZOLAM 0.25 MG/1
0.25 TABLET ORAL 2 TIMES DAILY
Qty: 75 TAB | Refills: 0 | Status: SHIPPED | OUTPATIENT
Start: 2017-10-03 | End: 2017-10-30 | Stop reason: SDUPTHER

## 2017-10-17 ENCOUNTER — OFFICE VISIT (OUTPATIENT)
Dept: INTERNAL MEDICINE CLINIC | Age: 82
End: 2017-10-17

## 2017-10-17 ENCOUNTER — HOSPITAL ENCOUNTER (OUTPATIENT)
Dept: LAB | Age: 82
Discharge: HOME | End: 2017-10-17
Payer: MEDICARE

## 2017-10-17 VITALS
RESPIRATION RATE: 20 BRPM | WEIGHT: 169.5 LBS | HEIGHT: 74 IN | DIASTOLIC BLOOD PRESSURE: 56 MMHG | OXYGEN SATURATION: 96 % | TEMPERATURE: 97.3 F | SYSTOLIC BLOOD PRESSURE: 123 MMHG | HEART RATE: 57 BPM | BODY MASS INDEX: 21.75 KG/M2

## 2017-10-17 DIAGNOSIS — R30.0 DYSURIA: Primary | ICD-10-CM

## 2017-10-17 DIAGNOSIS — N40.0 BENIGN PROSTATIC HYPERPLASIA, UNSPECIFIED WHETHER LOWER URINARY TRACT SYMPTOMS PRESENT: ICD-10-CM

## 2017-10-17 DIAGNOSIS — F03.91 DEMENTIA WITH BEHAVIORAL DISTURBANCE, UNSPECIFIED DEMENTIA TYPE: ICD-10-CM

## 2017-10-17 DIAGNOSIS — R82.90 BAD ODOR OF URINE: ICD-10-CM

## 2017-10-17 DIAGNOSIS — R41.0 CONFUSION: ICD-10-CM

## 2017-10-17 LAB
BILIRUB UR QL STRIP: NEGATIVE
GLUCOSE UR-MCNC: NEGATIVE MG/DL
KETONES P FAST UR STRIP-MCNC: NEGATIVE MG/DL
PH UR STRIP: 5 [PH] (ref 4.6–8)
PROT UR QL STRIP: NEGATIVE MG/DL
SP GR UR STRIP: 1.02 (ref 1–1.03)
UA UROBILINOGEN AMB POC: NORMAL (ref 0.2–1)
URINALYSIS CLARITY POC: CLEAR
URINALYSIS COLOR POC: YELLOW
URINE BLOOD POC: NEGATIVE
URINE LEUKOCYTES POC: NEGATIVE
URINE NITRITES POC: NEGATIVE

## 2017-10-17 PROCEDURE — 87086 URINE CULTURE/COLONY COUNT: CPT

## 2017-10-17 RX ORDER — TAMSULOSIN HYDROCHLORIDE 0.4 MG/1
0.4 CAPSULE ORAL DAILY
Qty: 30 CAP | Refills: 4 | Status: SHIPPED | OUTPATIENT
Start: 2017-10-17 | End: 2017-10-27 | Stop reason: SINTOL

## 2017-10-17 NOTE — PROGRESS NOTES
Health Maintenance Due   Topic Date Due    DTaP/Tdap/Td series (1 - Tdap) 02/03/1955    Pneumococcal 65+ High/Highest Risk (1 of 2 - PCV13) 02/03/1999    INFLUENZA AGE 9 TO ADULT  08/01/2017       Chief Complaint   Patient presents with    Dementia     getting worse, wants repeat labs and PSA       1. Have you been to the ER, urgent care clinic since your last visit? Hospitalized since your last visit? No    2. Have you seen or consulted any other health care providers outside of the [address] [place] [address] since your last visit? Include any pap smears or colon screening. No    3) Do you have an Advance Directive on file? no    4) Are you interested in receiving information on Advance Directives? NO      Patient is accompanied by daughter I have received verbal consent from [address] to discuss any/all medical information while they are present in the room.

## 2017-10-17 NOTE — PROGRESS NOTES
HISTORY OF PRESENT ILLNESS  Narda Goldberg is a 80 y.o. male  accompanied by  care giver and daughter. He was treated with abx for UTI,improved but now again he is more confused. Has strong urine odor. .no dysuria or flank pain. often.  has advance dementia. able to eat weight is stable. Has depression ,on med.using anxiety med often too. Labs reviewed. Has BPH,has urgency and hesitancy. Dementia      Bladder Infection    Pertinent negatives include no chills. Altered mental status      Depression         Review of Systems   Constitutional: Negative. Negative for chills and fever. HENT: Negative. Respiratory: Negative. Gastrointestinal: Negative. Genitourinary: Negative. Musculoskeletal: Negative. Skin:        Bleeding toe nail   Neurological: Negative. Psychiatric/Behavioral: Positive for depression and memory loss. The patient is nervous/anxious. Physical Exam   Constitutional: He appears well-developed and well-nourished. No distress. Neck: Normal range of motion. Neck supple. No JVD present. No thyromegaly present. Cardiovascular: Normal rate, regular rhythm, normal heart sounds and intact distal pulses. Pulmonary/Chest: Effort normal and breath sounds normal. No respiratory distress. He has no wheezes. Abdominal: Soft. Bowel sounds are normal. He exhibits no distension. There is no tenderness. KUB NT   Psychiatric: He has a normal mood and affect. His behavior is normal.   Adv dementia. depressed. ASSESSMENT and PLAN  Diagnoses and all orders for this visit:    1. Dysuria  -     AMB POC URINALYSIS DIP STICK MANUAL W/O MICRO    Neg for UTI. Will do,  -     CULTURE, URINE    2. Confusion  -     AMB POC URINALYSIS DIP STICK MANUAL W/O MICRO    3. Benign prostatic hyperplasia, unspecified whether lower urinary tract symptoms present   I think residual urine in prostate causing UTI. Will start,  -     tamsulosin (FLOMAX) 0.4 mg capsule;  Take 1 Cap by mouth daily.    4. Dementia with behavioral disturbance, unspecified dementia type    On med. has ups and downs. has care giver,overall stable. 5. Bad odor of urine  -     CULTURE, URINE          Discussed expected course/resolution/complications of diagnosis in detail with patient. Medication risks/benefits/costs/interactions/alternatives discussed with patient. Pt was given an after visit summary which includes diagnoses, current medications & vitals. Pt expressed understanding with the diagnosis and plan.

## 2017-10-17 NOTE — MR AVS SNAPSHOT
Visit Information Date & Time Provider Department Dept. Phone Encounter #  
 10/17/2017  1:30 PM Elayne Soulier, MD [facility] Internal Medicine [facility] [phone] [id number] Upcoming Health Maintenance Date Due DTaP/Tdap/Td series (1 - Tdap) 2/3/1955 Pneumococcal 65+ High/Highest Risk (1 of 2 - PCV13) 2/3/1999 INFLUENZA AGE 9 TO ADULT 8/1/2017 GLAUCOMA SCREENING Q2Y 4/18/2018 MEDICARE YEARLY EXAM 8/19/2018 Allergies as of 10/17/2017  Review Complete On: 10/17/2017 By: Elayne Soulier, MD  
  
 Severity Noted Reaction Type Reactions Namenda [Memantine]  12/16/2014    Shortness of Breath Current Immunizations  Reviewed on 3/12/2015 Name Date Influenza Vaccine 11/1/2014 Zoster Vaccine, Live 12/23/2014 Not reviewed this visit You Were Diagnosed With   
  
 Codes Comments Dysuria    -  Primary ICD-10-CM: R30.0 ICD-9-CM: 936. 1 Confusion     ICD-10-CM: R41.0 ICD-9-CM: 298.9 Benign prostatic hyperplasia, unspecified whether lower urinary tract symptoms present     ICD-10-CM: N40.0 ICD-9-CM: 600.00 Dementia with behavioral disturbance, unspecified dementia type     ICD-10-CM: F03.91 
ICD-9-CM: 294.21 Bad odor of urine     ICD-10-CM: R82.90 ICD-9-CM: 791.9 Vitals BP Pulse Temp Resp Height(growth percentile) Weight(growth percentile) 123/56 (BP 1 Location: Right arm, BP Patient Position: Sitting) (!) 57 97.3 °F (36.3 °C) (Oral) 20 6' 2\" (1.88 m) 169 lb 8 oz (76.9 kg) SpO2 BMI Smoking Status 96% 21.76 kg/m2 Former Smoker Vitals History BMI and BSA Data Body Mass Index Body Surface Area 21.76 kg/m 2 2 m 2 Preferred Pharmacy Pharmacy Name Phone [facility] [phone] Your Updated Medication List  
  
   
This list is accurate as of: 10/17/17  2:19 PM.  Always use your most recent med list.  
  
  
  
  
 ALPRAZolam 0.25 mg tablet Commonly known as:  Willi Chew Take 1 Tab by mouth two (2) times a day. Max Daily Amount: 0.5 mg. Take another 1 pill for severe anxiety  
  
 aspirin 325 mg tablet Commonly known as:  ASPIRIN Take 325 mg by mouth daily. atenolol 25 mg tablet Commonly known as:  TENORMIN Take 25 mg by mouth daily. donepezil 10 mg tablet Commonly known as:  ARICEPT Take 1 Tab by mouth nightly. escitalopram oxalate 10 mg tablet Commonly known as:  Gretta Bares Take 1 Tab by mouth daily. food supplemt, lactose-reduced Liqd Commonly known as:  ENSURE Take 237 mL by mouth daily. QUEtiapine 25 mg tablet Commonly known as:  SEROquel TAKE 1 TABLET BY MOUTH KATIA. senna-docusate 8.6-50 mg per tablet Commonly known as:  Deion Abebeer Take 1 Tab by mouth two (2) times daily as needed for Constipation. simvastatin 20 mg tablet Commonly known as:  ZOCOR  
TAKE 1 TABLET BY MOUTH NIGHTLY. STOP ZOCOR 40MG. tamsulosin 0.4 mg capsule Commonly known as:  FLOMAX Take 1 Cap by mouth daily. therapeutic multivitamin tablet Commonly known as:  [facility] Take 1 Tab by mouth daily. Prescriptions Sent to Pharmacy Refills  
 tamsulosin (FLOMAX) 0.4 mg capsule 4 Sig: Take 1 Cap by mouth daily. Class: Normal  
 Pharmacy: [address]  #: [phone] Route: Oral  
  
We Performed the Following AMB POC URINALYSIS DIP STICK MANUAL W/O MICRO [86755 CPT(R)] CULTURE, URINE [id number] CPT(R)] Please provide this summary of care documentation to your next provider. Your primary care clinician is listed as Angelika Diaz. If you have any questions after today's visit, please call [phone].

## 2017-10-18 LAB — BACTERIA UR CULT: NORMAL

## 2017-10-27 ENCOUNTER — HOSPITAL ENCOUNTER (OUTPATIENT)
Dept: LAB | Age: 82
Discharge: HOME | End: 2017-10-27
Payer: MEDICARE

## 2017-10-27 ENCOUNTER — OFFICE VISIT (OUTPATIENT)
Dept: INTERNAL MEDICINE CLINIC | Age: 82
End: 2017-10-27

## 2017-10-27 VITALS
SYSTOLIC BLOOD PRESSURE: 127 MMHG | TEMPERATURE: 97.5 F | RESPIRATION RATE: 16 BRPM | BODY MASS INDEX: 20.79 KG/M2 | WEIGHT: 162 LBS | OXYGEN SATURATION: 97 % | HEART RATE: 76 BPM | HEIGHT: 74 IN | DIASTOLIC BLOOD PRESSURE: 76 MMHG

## 2017-10-27 DIAGNOSIS — R53.83 FATIGUE, UNSPECIFIED TYPE: ICD-10-CM

## 2017-10-27 DIAGNOSIS — R41.0 CONFUSION: Primary | ICD-10-CM

## 2017-10-27 DIAGNOSIS — R51.9 NONINTRACTABLE HEADACHE, UNSPECIFIED CHRONICITY PATTERN, UNSPECIFIED HEADACHE TYPE: ICD-10-CM

## 2017-10-27 DIAGNOSIS — F03.91 DEMENTIA WITH BEHAVIORAL DISTURBANCE, UNSPECIFIED DEMENTIA TYPE: ICD-10-CM

## 2017-10-27 LAB
BILIRUB UR QL STRIP: NEGATIVE
GLUCOSE UR-MCNC: NEGATIVE MG/DL
KETONES P FAST UR STRIP-MCNC: NORMAL MG/DL
PH UR STRIP: 6 [PH] (ref 4.6–8)
PROT UR QL STRIP: NEGATIVE MG/DL
SP GR UR STRIP: 1.02 (ref 1–1.03)
UA UROBILINOGEN AMB POC: NORMAL (ref 0.2–1)
URINALYSIS CLARITY POC: CLEAR
URINALYSIS COLOR POC: YELLOW
URINE BLOOD POC: NORMAL
URINE LEUKOCYTES POC: NEGATIVE
URINE NITRITES POC: NEGATIVE

## 2017-10-27 PROCEDURE — 85025 COMPLETE CBC W/AUTO DIFF WBC: CPT

## 2017-10-27 PROCEDURE — 87088 URINE BACTERIA CULTURE: CPT

## 2017-10-27 PROCEDURE — 80053 COMPREHEN METABOLIC PANEL: CPT

## 2017-10-27 NOTE — MR AVS SNAPSHOT
Visit Information Date & Time Provider Department Dept. Phone Encounter #  
 10/27/2017  9:30 AM Enoc Chong, [address] [facility] Internal Medicine [address] [place] [address] [phone] [id number] Your Appointments 12/5/2017  1:30 PM  
Any with MD Nga Pérez ([facility]) Appt Note: 7 WKS FU  
 [address] [place] [address]. [phone]  
  
   
 [address] [place] [address]. [address] [place] [phone] Upcoming Health Maintenance Date Due DTaP/Tdap/Td series (1 - Tdap) 2/3/1955 Pneumococcal 65+ High/Highest Risk (1 of 2 - PCV13) 2/3/1999 INFLUENZA AGE 9 TO ADULT 8/1/2017 GLAUCOMA SCREENING Q2Y 4/18/2018 MEDICARE YEARLY EXAM 8/19/2018 Allergies as of 10/27/2017  Review Complete On: 10/27/2017 By: Enoc Chong NP Severity Noted Reaction Type Reactions Namenda [Memantine]  12/16/2014    Shortness of Breath Current Immunizations  Reviewed on 3/12/2015 Name Date Influenza Vaccine 11/1/2014 Zoster Vaccine, Live 12/23/2014 Not reviewed this visit You Were Diagnosed With   
  
 Codes Comments Confusion    -  Primary ICD-10-CM: R41.0 ICD-9-CM: 298.9 Fatigue, unspecified type     ICD-10-CM: R53.83 ICD-9-CM: 780.79 Nonintractable headache, unspecified chronicity pattern, unspecified headache type     ICD-10-CM: R51 ICD-9-CM: 784.0 Dementia with behavioral disturbance, unspecified dementia type     ICD-10-CM: F03.91 
ICD-9-CM: 294.21 Vitals BP Pulse Temp Resp Height(growth percentile) Weight(growth percentile) 127/76 (BP 1 Location: Right arm, BP Patient Position: Sitting) 76 97.5 °F (36.4 °C) (Oral) 16 6' 2\" (1.88 m) 162 lb (73.5 kg) SpO2 BMI Smoking Status 97% 20.8 kg/m2 Former Smoker Vitals History BMI and BSA Data Body Mass Index Body Surface Area  
 20.8 kg/m 2 1.96 m 2 Preferred Pharmacy Pharmacy Name Phone [facility] [phone] Your Updated Medication List  
  
   
This list is accurate as of: 10/27/17 10:15 AM.  Always use your most recent med list.  
  
  
  
  
 ALPRAZolam 0.25 mg tablet Commonly known as:  Leland Lisa Take 1 Tab by mouth two (2) times a day. Max Daily Amount: 0.5 mg. Take another 1 pill for severe anxiety  
  
 aspirin 325 mg tablet Commonly known as:  ASPIRIN Take 325 mg by mouth daily. atenolol 25 mg tablet Commonly known as:  TENORMIN Take 25 mg by mouth daily. donepezil 10 mg tablet Commonly known as:  ARICEPT Take 1 Tab by mouth nightly. escitalopram oxalate 10 mg tablet Commonly known as:  Timym Seamus Take 1 Tab by mouth daily. food supplemt, lactose-reduced Liqd Commonly known as:  ENSURE Take 237 mL by mouth daily. QUEtiapine 25 mg tablet Commonly known as:  SEROquel TAKE 1 TABLET BY MOUTH NIGHLY. senna-docusate 8.6-50 mg per tablet Commonly known as:  Edwina Federico Take 1 Tab by mouth two (2) times daily as needed for Constipation. simvastatin 20 mg tablet Commonly known as:  ZOCOR  
TAKE 1 TABLET BY MOUTH NIGHTLY. STOP ZOCOR 40MG. therapeutic multivitamin tablet Commonly known as:  [facility] Take 1 Tab by mouth daily. We Performed the Following AMB POC URINALYSIS DIP STICK MANUAL W/O MICRO [04046 CPT(R)] CBC WITH AUTOMATED DIFF [97046 CPT(R)] CULTURE, URINE [id number] CPT(R)] METABOLIC PANEL, COMPREHENSIVE [77052 CPT(R)] Please provide this summary of care documentation to your next provider. Your primary care clinician is listed as Liseth Olmos. If you have any questions after today's visit, please call [phone].

## 2017-10-27 NOTE — LETTER
10/30/2017 12:53 PM 
 
Mr. [address] [facility] [phone] Dear Sabina Lainez: 
 
Please find your most recent results below. Resulted Orders AMB POC URINALYSIS DIP STICK MANUAL W/O MICRO Result Value Ref Range Color (UA POC) Yellow Clarity (UA POC) Clear Glucose (UA POC) Negative Negative Bilirubin (UA POC) Negative Negative Ketones (UA POC) Trace Negative Specific gravity (UA POC) 1.020 1.001 - 1.035 Blood (UA POC) Trace Negative pH (UA POC) 6.0 4.6 - 8.0 Protein (UA POC) Negative Negative mg/dL Urobilinogen (UA POC) 0.2 mg/dL 0.2 - 1 Nitrites (UA POC) Negative Negative Leukocyte esterase (UA POC) Negative Negative CULTURE, URINE Result Value Ref Range Urine Culture, Routine No growth Narrative Performed at:  [address] [place] [address]  [id number] : Loren Blanton MD, Phone:  [phone] CBC WITH AUTOMATED DIFF Result Value Ref Range WBC 7.7 3.4 - 10.8 x10E3/uL  
 RBC 4.33 4.14 - 5.80 x10E6/uL HGB 13.5 12.6 - 17.7 g/dL HCT 41.2 37.5 - 51.0 % MCV 95 79 - 97 fL  
 MCH 31.2 26.6 - 33.0 pg  
 MCHC 32.8 31.5 - 35.7 g/dL  
 RDW 14.4 12.3 - 15.4 % PLATELET 425 550 - 209 x10E3/uL NEUTROPHILS 69 Not Estab. % Lymphocytes 21 Not Estab. % MONOCYTES 7 Not Estab. % EOSINOPHILS 2 Not Estab. % BASOPHILS 1 Not Estab. %  
 ABS. NEUTROPHILS 5.3 1.4 - 7.0 x10E3/uL Abs Lymphocytes 1.6 0.7 - 3.1 x10E3/uL  
 ABS. MONOCYTES 0.6 0.1 - 0.9 x10E3/uL  
 ABS. EOSINOPHILS 0.2 0.0 - 0.4 x10E3/uL  
 ABS. BASOPHILS 0.0 0.0 - 0.2 x10E3/uL IMMATURE GRANULOCYTES 0 Not Estab. %  
 ABS. IMM. GRANS. 0.0 0.0 - 0.1 x10E3/uL Narrative Performed at:  [address]  [id number] : Kanchan Davison MD, Phone:  [phone] METABOLIC PANEL, COMPREHENSIVE Result Value Ref Range Glucose 122 (H) 65 - 99 mg/dL BUN 27 8 - 27 mg/dL Creatinine 1.00 0.76 - 1.27 mg/dL GFR est non-AA 69 >59 mL/min/1.73 GFR est AA 80 >59 mL/min/1.73  
 BUN/Creatinine ratio 27 (H) 10 - 24 Sodium 143 134 - 144 mmol/L Potassium 4.0 3.5 - 5.2 mmol/L Chloride 103 96 - 106 mmol/L  
 CO2 24 18 - 29 mmol/L Calcium 9.0 8.6 - 10.2 mg/dL Protein, total 6.8 6.0 - 8.5 g/dL Albumin 4.1 3.5 - 4.7 g/dL GLOBULIN, TOTAL 2.7 1.5 - 4.5 g/dL A-G Ratio 1.5 1.2 - 2.2 Bilirubin, total 1.1 0.0 - 1.2 mg/dL Alk. phosphatase 61 39 - 117 IU/L  
 AST (SGOT) 28 0 - 40 IU/L  
 ALT (SGPT) 15 0 - 44 IU/L Narrative Performed at:  [address]  [id number] : Geni Alexis MD, Phone:  [phone] RECOMMENDATIONS: 
Urine culture negative. Labs are stable. Please call me if you have any questions: [phone] Sincerely, Esdras Pitts NP

## 2017-10-27 NOTE — PROGRESS NOTES
HISTORY OF PRESENT ILLNESS  Zoila Pitts is a 80 y.o. male. This is a patient of Dr. Marce Sung who presents today with caregiver with concerns of headache and fatigue. The patient has had complaints of mild headache and fatigue over the last several days. He denies dizziness. No vision change. Per the patient's caregiver, he has also had an increase in periods of agitation and anxiety. He is currently ordered Xanax 0.25 mg; he has been taking this about three times daily recently. Of note, patient was started on Flomax at last visit with Dr. Marce Sung about ten days ago related to possible urinary retention, with frequent UTI. Visit Vitals    /76 (BP 1 Location: Right arm, BP Patient Position: Sitting)    Pulse 76    Temp 97.5 °F (36.4 °C) (Oral)    Resp 16    Ht 6' 2\" (1.88 m)    Wt 162 lb (73.5 kg)    SpO2 97%    BMI 20.8 kg/m2     HPI    Review of Systems   Constitutional: Positive for malaise/fatigue. Negative for fever. HENT: Negative for congestion. Respiratory: Negative for cough. Cardiovascular: Negative for chest pain and leg swelling. Gastrointestinal: Negative for abdominal pain. Musculoskeletal: Negative. Skin: Negative. Neurological: Positive for headaches. Negative for dizziness, speech change, focal weakness and seizures. Psychiatric/Behavioral: The patient is nervous/anxious. Physical Exam   Constitutional: He appears well-developed and well-nourished. No distress. HENT:   Head: Normocephalic and atraumatic. Eyes: Conjunctivae are normal. Pupils are equal, round, and reactive to light. Cardiovascular: Normal rate and regular rhythm. Pulmonary/Chest: Effort normal and breath sounds normal. No respiratory distress. He has no wheezes. He has no rales. Abdominal: Soft. Bowel sounds are normal. He exhibits no distension. There is no tenderness. Musculoskeletal: He exhibits no edema. Neurological: He is alert.    Oriented to self  Answering simple questions appropriately   Skin: Skin is warm and dry. Mild reduced skin turgor   Nursing note and vitals reviewed. ASSESSMENT and PLAN  Encounter Diagnoses   Name Primary?  Confusion Yes    Fatigue, unspecified type     Nonintractable headache, unspecified chronicity pattern, unspecified headache type     Dementia with behavioral disturbance, unspecified dementia type      Will discontinue Flomax. CBC, CMP collected in office today. U/A C&S. Encourage PO fluids, as tolerated. If experiencing severe head pain and/or neurological changes such as slurred speech, vision change, or extremity weakness, present to ER. Lab results and schedule of future lab studies reviewed   Reviewed medications and side effects in detail     Discussed above plan of care with Dr. Ni Finn.

## 2017-10-27 NOTE — PROGRESS NOTES
Chief Complaint   Patient presents with    Headache    Fatigue     seems to be in a \"daze\" per private duty caregiver--    Depression     some things are escalated since on Flomax     1. Have you been to the ER, urgent care clinic since your last visit? Hospitalized since your last visit? No    2. Have you seen or consulted any other health care providers outside of the [address] [place] [address] since your last visit? Include any pap smears or colon screening.  No

## 2017-10-28 LAB
ALBUMIN SERPL-MCNC: 4.1 G/DL (ref 3.5–4.7)
ALBUMIN/GLOB SERPL: 1.5 {RATIO} (ref 1.2–2.2)
ALP SERPL-CCNC: 61 IU/L (ref 39–117)
ALT SERPL-CCNC: 15 IU/L (ref 0–44)
AST SERPL-CCNC: 28 IU/L (ref 0–40)
BACTERIA UR CULT: NO GROWTH
BASOPHILS # BLD AUTO: 0 X10E3/UL (ref 0–0.2)
BASOPHILS NFR BLD AUTO: 1 %
BILIRUB SERPL-MCNC: 1.1 MG/DL (ref 0–1.2)
BUN SERPL-MCNC: 27 MG/DL (ref 8–27)
BUN/CREAT SERPL: 27 (ref 10–24)
CALCIUM SERPL-MCNC: 9 MG/DL (ref 8.6–10.2)
CHLORIDE SERPL-SCNC: 103 MMOL/L (ref 96–106)
CO2 SERPL-SCNC: 24 MMOL/L (ref 18–29)
CREAT SERPL-MCNC: 1 MG/DL (ref 0.76–1.27)
EOSINOPHIL # BLD AUTO: 0.2 X10E3/UL (ref 0–0.4)
EOSINOPHIL NFR BLD AUTO: 2 %
ERYTHROCYTE [DISTWIDTH] IN BLOOD BY AUTOMATED COUNT: 14.4 % (ref 12.3–15.4)
GFR SERPLBLD CREATININE-BSD FMLA CKD-EPI: 69 ML/MIN/1.73
GFR SERPLBLD CREATININE-BSD FMLA CKD-EPI: 80 ML/MIN/1.73
GLOBULIN SER CALC-MCNC: 2.7 G/DL (ref 1.5–4.5)
GLUCOSE SERPL-MCNC: 122 MG/DL (ref 65–99)
HCT VFR BLD AUTO: 41.2 % (ref 37.5–51)
HGB BLD-MCNC: 13.5 G/DL (ref 12.6–17.7)
IMM GRANULOCYTES # BLD: 0 X10E3/UL (ref 0–0.1)
IMM GRANULOCYTES NFR BLD: 0 %
LYMPHOCYTES # BLD AUTO: 1.6 X10E3/UL (ref 0.7–3.1)
LYMPHOCYTES NFR BLD AUTO: 21 %
MCH RBC QN AUTO: 31.2 PG (ref 26.6–33)
MCHC RBC AUTO-ENTMCNC: 32.8 G/DL (ref 31.5–35.7)
MCV RBC AUTO: 95 FL (ref 79–97)
MONOCYTES # BLD AUTO: 0.6 X10E3/UL (ref 0.1–0.9)
MONOCYTES NFR BLD AUTO: 7 %
NEUTROPHILS # BLD AUTO: 5.3 X10E3/UL (ref 1.4–7)
NEUTROPHILS NFR BLD AUTO: 69 %
PLATELET # BLD AUTO: 200 X10E3/UL (ref 150–379)
POTASSIUM SERPL-SCNC: 4 MMOL/L (ref 3.5–5.2)
PROT SERPL-MCNC: 6.8 G/DL (ref 6–8.5)
RBC # BLD AUTO: 4.33 X10E6/UL (ref 4.14–5.8)
SODIUM SERPL-SCNC: 143 MMOL/L (ref 134–144)
WBC # BLD AUTO: 7.7 X10E3/UL (ref 3.4–10.8)

## 2017-10-30 DIAGNOSIS — F41.0 ANXIETY ATTACK: ICD-10-CM

## 2017-10-31 RX ORDER — ALPRAZOLAM 0.25 MG/1
TABLET ORAL
Qty: 75 TAB | Refills: 0 | Status: SHIPPED | OUTPATIENT
Start: 2017-10-31 | End: 2017-11-03 | Stop reason: SDUPTHER

## 2017-11-03 ENCOUNTER — OFFICE VISIT (OUTPATIENT)
Dept: INTERNAL MEDICINE CLINIC | Age: 82
End: 2017-11-03

## 2017-11-03 VITALS
SYSTOLIC BLOOD PRESSURE: 135 MMHG | HEART RATE: 77 BPM | HEIGHT: 74 IN | RESPIRATION RATE: 16 BRPM | OXYGEN SATURATION: 97 % | TEMPERATURE: 98.6 F | WEIGHT: 163 LBS | DIASTOLIC BLOOD PRESSURE: 72 MMHG | BODY MASS INDEX: 20.92 KG/M2

## 2017-11-03 DIAGNOSIS — I25.83 CORONARY ARTERY DISEASE DUE TO LIPID RICH PLAQUE: ICD-10-CM

## 2017-11-03 DIAGNOSIS — F41.9 ANXIETY: ICD-10-CM

## 2017-11-03 DIAGNOSIS — K59.00 CONSTIPATION, UNSPECIFIED CONSTIPATION TYPE: ICD-10-CM

## 2017-11-03 DIAGNOSIS — F32.A DEPRESSION, UNSPECIFIED DEPRESSION TYPE: ICD-10-CM

## 2017-11-03 DIAGNOSIS — Z11.1 SCREENING-PULMONARY TB: ICD-10-CM

## 2017-11-03 DIAGNOSIS — F03.91 DEMENTIA WITH BEHAVIORAL DISTURBANCE, UNSPECIFIED DEMENTIA TYPE: Primary | ICD-10-CM

## 2017-11-03 DIAGNOSIS — E78.5 HYPERLIPIDEMIA, UNSPECIFIED HYPERLIPIDEMIA TYPE: ICD-10-CM

## 2017-11-03 DIAGNOSIS — I25.10 CORONARY ARTERY DISEASE DUE TO LIPID RICH PLAQUE: ICD-10-CM

## 2017-11-03 RX ORDER — ALPRAZOLAM 0.25 MG/1
0.25 TABLET ORAL 2 TIMES DAILY
Qty: 60 TAB | Refills: 0 | Status: SHIPPED | OUTPATIENT
Start: 2017-11-03 | End: 2018-05-18

## 2017-11-03 RX ORDER — ALPRAZOLAM 0.25 MG/1
0.25 TABLET ORAL
Qty: 15 TAB | Refills: 0 | Status: SHIPPED | OUTPATIENT
Start: 2017-11-03 | End: 2018-05-18

## 2017-11-03 NOTE — PROGRESS NOTES
Chief Complaint   Patient presents with    Form Completion     1. Have you been to the ER, urgent care clinic since your last visit? Hospitalized since your last visit? No    2. Have you seen or consulted any other health care providers outside of the [address] [place] [address] since your last visit? Include any pap smears or colon screening.  No

## 2017-11-03 NOTE — MR AVS SNAPSHOT
Visit Information Date & Time Provider Department Dept. Phone Encounter #  
 11/3/2017  9:00 AM Destiny Quarles, 329 [facility] Internal Medicine [facility] [phone] [id number] Your Appointments 11/7/2017 11:15 AM  
ROUTINE CARE with MD Nga Craft ([address] [place] [address]) Appt Note: 2 wk fuv  
 [address] [place] [address]. A Building [facility] 7 [phone]  
[phone]  
  
   
 [address] [place] [address]. [address] [place] [phone]  
  
    
 12/5/2017  1:30 PM  
Any with MD Nga Craft ([address] [place] [address]) Appt Note: 7 WKS FU  
 [address] [place] [address]. A Building [facility] 7 [phone]  
[phone]  
  
   
 [address] [place] [address]. [address] [place] [phone] Upcoming Health Maintenance Date Due DTaP/Tdap/Td series (1 - Tdap) 2/3/1955 Pneumococcal 65+ High/Highest Risk (1 of 2 - PCV13) 2/3/1999 INFLUENZA AGE 9 TO ADULT 8/1/2017 GLAUCOMA SCREENING Q2Y 4/18/2018 MEDICARE YEARLY EXAM 8/19/2018 Allergies as of 11/3/2017  Review Complete On: 11/3/2017 By: Destiny Quarles NP Severity Noted Reaction Type Reactions Namenda [Memantine]  12/16/2014    Shortness of Breath Current Immunizations  Reviewed on 3/12/2015 Name Date Influenza Vaccine 11/1/2014 Zoster Vaccine, Live 12/23/2014 Not reviewed this visit You Were Diagnosed With   
  
 Codes Comments Screening-pulmonary TB    -  Primary ICD-10-CM: Z11.1 ICD-9-CM: V74.1 Vitals BP Pulse Temp Resp Height(growth percentile) Weight(growth percentile) 135/72 (BP 1 Location: Right arm, BP Patient Position: Sitting) 77 98.6 °F (37 °C) (Oral) 16 6' 2\" (1.88 m) 163 lb (73.9 kg) SpO2 BMI Smoking Status 97% 20.93 kg/m2 Former Smoker Vitals History BMI and BSA Data Body Mass Index Body Surface Area  
 20.93 kg/m 2 1.96 m 2 Preferred Pharmacy Pharmacy Name Phone [facility] [phone] Your Updated Medication List  
  
   
This list is accurate as of: 11/3/17  9:24 AM.  Always use your most recent med list.  
  
  
  
  
 ALPRAZolam 0.25 mg tablet Commonly known as:  XANAX  
TAKE 1 TABLET BY MOUTH TWICE A DAY AND TAKE 1 TABLET IF NEEDED FOR SEVERE ANXIETY. aspirin 325 mg tablet Commonly known as:  ASPIRIN Take 325 mg by mouth daily. atenolol 25 mg tablet Commonly known as:  TENORMIN Take 25 mg by mouth daily. donepezil 10 mg tablet Commonly known as:  ARICEPT Take 1 Tab by mouth nightly. escitalopram oxalate 10 mg tablet Commonly known as:  Zaida Reddy Take 1 Tab by mouth daily. QUEtiapine 25 mg tablet Commonly known as:  SEROquel TAKE 1 TABLET BY MOUTH KTAIA. senna-docusate 8.6-50 mg per tablet Commonly known as:  Roselinda Cristopher Take 1 Tab by mouth two (2) times daily as needed for Constipation. simvastatin 20 mg tablet Commonly known as:  ZOCOR  
TAKE 1 TABLET BY MOUTH NIGHTLY. STOP ZOCOR 40MG. therapeutic multivitamin tablet Commonly known as:  [facility] Take 1 Tab by mouth daily. To-Do List   
 11/03/2017 Imaging:  XR CHEST SNGL V Please provide this summary of care documentation to your next provider. Your primary care clinician is listed as Ame Sanders. If you have any questions after today's visit, please call [phone].

## 2017-11-03 NOTE — PROGRESS NOTES
HISTORY OF PRESENT ILLNESS  Suzanna Kay is a 80 y.o. male. This is a patient of Dr. Jarrett Lilly who presents today with his daughter for follow-up and completion of assisted living forms. Patient is planning to move to [facility] on 11/7/17. Patient states he is feeling well at the time of today's visit. He denies pain. Per patient's daughter, patient's weakness, as described at last visit, has improved since discontinuation of Flomax. Visit Vitals    /72 (BP 1 Location: Right arm, BP Patient Position: Sitting)    Pulse 77    Temp 98.6 °F (37 °C) (Oral)    Resp 16    Ht 6' 2\" (1.88 m)    Wt 163 lb (73.9 kg)    SpO2 97%    BMI 20.93 kg/m2     HPI    Review of Systems   Constitutional: Negative for fever. HENT: Negative for congestion. Respiratory: Negative for cough. Cardiovascular: Negative for chest pain and leg swelling. Gastrointestinal: Negative for abdominal pain. Musculoskeletal: Negative. Skin: Negative. Neurological: Negative for dizziness and headaches. Psychiatric/Behavioral: Positive for memory loss. Physical Exam   Constitutional: He appears well-developed and well-nourished. No distress. HENT:   Head: Normocephalic and atraumatic. Eyes: Conjunctivae are normal. Pupils are equal, round, and reactive to light. Cardiovascular: Normal rate and regular rhythm. Pulmonary/Chest: Effort normal and breath sounds normal. No respiratory distress. He has no wheezes. He has no rales. Abdominal: Soft. Bowel sounds are normal. He exhibits no distension. There is no tenderness. Musculoskeletal: He exhibits no edema. Neurological: He is alert. Oriented to self  Answering simple questions appropriately   Skin: Skin is warm and dry. Nursing note and vitals reviewed. ASSESSMENT and PLAN    ICD-10-CM ICD-9-CM   1. Dementia with behavioral disturbance, unspecified dementia type F03.91 294.21   2.  Coronary artery disease due to lipid rich plaque I25.10 414.00    I25.83 414.3   3. Hyperlipidemia, unspecified hyperlipidemia type E78.5 272.4   4. Anxiety F41.9 300.00        5. Depression, unspecified depression type F32.9 311   6. Constipation, unspecified constipation type K59.00 564.00   7. Screening-pulmonary TB Z11.1 V74.1     Completed [facility] assisted living forms, per request.    Orders Placed This Encounter    Will order  XR CHEST SNGL V     Standing Status:   Future     Standing Expiration Date:   12/3/2018     Order Specific Question:   Reason for Exam     Answer:   TB screen    Will refill:  ALPRAZolam (XANAX) 0.25 mg tablet     Sig: Take 1 Tab by mouth two (2) times a day. Max Daily Amount: 0.5 mg.     Dispense:  60 Tab     Refill:  0    ALPRAZolam (XANAX) 0.25 mg tablet     Sig: Take 1 Tab by mouth daily as needed for Anxiety. Dispense:  15 Tab     Refill:  0       Lab results and schedule of future lab studies reviewed   Reviewed medications and side effects in detail      Reviewed plan of care with patient who acknowledges understanding and agrees.

## 2017-12-02 DIAGNOSIS — Z11.1 SCREENING-PULMONARY TB: ICD-10-CM

## 2018-05-18 ENCOUNTER — HOSPITAL ENCOUNTER (EMERGENCY)
Age: 83
Discharge: HOME | End: 2018-05-18
Attending: STUDENT IN AN ORGANIZED HEALTH CARE EDUCATION/TRAINING PROGRAM
Payer: MEDICARE

## 2018-05-18 ENCOUNTER — HOSPITAL ENCOUNTER (EMERGENCY)
Age: 83
Discharge: NURSING, REHAB OR LONG-TERM CARE | End: 2018-05-18
Attending: EMERGENCY MEDICINE | Admitting: EMERGENCY MEDICINE
Payer: MEDICARE

## 2018-05-18 ENCOUNTER — APPOINTMENT (OUTPATIENT)
Dept: CT IMAGING | Age: 83
End: 2018-05-18
Attending: PHYSICIAN ASSISTANT
Payer: MEDICARE

## 2018-05-18 ENCOUNTER — APPOINTMENT (OUTPATIENT)
Dept: CT IMAGING | Age: 83
End: 2018-05-18
Attending: EMERGENCY MEDICINE
Payer: MEDICARE

## 2018-05-18 VITALS
RESPIRATION RATE: 18 BRPM | OXYGEN SATURATION: 98 % | DIASTOLIC BLOOD PRESSURE: 67 MMHG | WEIGHT: 160 LBS | SYSTOLIC BLOOD PRESSURE: 133 MMHG | HEIGHT: 74 IN | HEART RATE: 50 BPM | BODY MASS INDEX: 20.53 KG/M2

## 2018-05-18 VITALS
OXYGEN SATURATION: 90 % | BODY MASS INDEX: 20.53 KG/M2 | HEIGHT: 74 IN | SYSTOLIC BLOOD PRESSURE: 154 MMHG | WEIGHT: 160 LBS | HEART RATE: 44 BPM | RESPIRATION RATE: 20 BRPM | DIASTOLIC BLOOD PRESSURE: 73 MMHG | TEMPERATURE: 98.2 F

## 2018-05-18 DIAGNOSIS — S16.1XXA STRAIN OF NECK MUSCLE, INITIAL ENCOUNTER: ICD-10-CM

## 2018-05-18 DIAGNOSIS — S01.81XA LACERATION OF FOREHEAD WITHOUT COMPLICATION, INITIAL ENCOUNTER: ICD-10-CM

## 2018-05-18 DIAGNOSIS — S51.812A SKIN TEAR OF LEFT FOREARM WITHOUT COMPLICATION, INITIAL ENCOUNTER: ICD-10-CM

## 2018-05-18 DIAGNOSIS — I48.91 ATRIAL FIBRILLATION, UNSPECIFIED TYPE (HCC): ICD-10-CM

## 2018-05-18 DIAGNOSIS — S09.90XA INJURY OF HEAD, INITIAL ENCOUNTER: Primary | ICD-10-CM
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
## 2018-05-18 DIAGNOSIS — S00.83XA CONTUSION OF FACE, INITIAL ENCOUNTER: ICD-10-CM

## 2018-05-18 DIAGNOSIS — S09.90XA INJURY OF HEAD, INITIAL ENCOUNTER: ICD-10-CM

## 2018-05-18 DIAGNOSIS — R77.8 ELEVATED TROPONIN: ICD-10-CM

## 2018-05-18 DIAGNOSIS — S01.01XA LACERATION OF SCALP, INITIAL ENCOUNTER: ICD-10-CM

## 2018-05-18 LAB
ALBUMIN SERPL-MCNC: 3.6 G/DL (ref 3.5–5)
ALBUMIN/GLOB SERPL: 1 {RATIO} (ref 1.1–2.2)
ALP SERPL-CCNC: 56 U/L (ref 45–117)
ALT SERPL-CCNC: 26 U/L (ref 12–78)
ANION GAP SERPL CALC-SCNC: 5 MMOL/L (ref 5–15)
APPEARANCE UR: CLEAR
AST SERPL-CCNC: ABNORMAL U/L (ref 15–37)
ATRIAL RATE: 115 BPM
BACTERIA URNS QL MICRO: NEGATIVE /HPF
BASOPHILS # BLD: 0.1 K/UL (ref 0–0.1)
BASOPHILS NFR BLD: 1 % (ref 0–1)
BILIRUB SERPL-MCNC: 0.7 MG/DL (ref 0.2–1)
BILIRUB UR QL: NEGATIVE
BUN SERPL-MCNC: 22 MG/DL (ref 6–20)
BUN/CREAT SERPL: 22 (ref 12–20)
CALCIUM SERPL-MCNC: 9 MG/DL (ref 8.5–10.1)
CALCULATED R AXIS, ECG10: 79 DEGREES
CALCULATED T AXIS, ECG11: -107 DEGREES
CHLORIDE SERPL-SCNC: 105 MMOL/L (ref 97–108)
CO2 SERPL-SCNC: 28 MMOL/L (ref 21–32)
COLOR UR: NORMAL
CREAT SERPL-MCNC: 1 MG/DL (ref 0.7–1.3)
DIAGNOSIS, 93000: NORMAL
DIFFERENTIAL METHOD BLD: ABNORMAL
EOSINOPHIL # BLD: 0.1 K/UL (ref 0–0.4)
EOSINOPHIL NFR BLD: 1 % (ref 0–7)
EPITH CASTS URNS QL MICRO: NORMAL /LPF
ERYTHROCYTE [DISTWIDTH] IN BLOOD BY AUTOMATED COUNT: 14.2 % (ref 11.5–14.5)
GLOBULIN SER CALC-MCNC: 3.7 G/DL (ref 2–4)
GLUCOSE BLD STRIP.AUTO-MCNC: 87 MG/DL (ref 65–100)
GLUCOSE SERPL-MCNC: 128 MG/DL (ref 65–100)
GLUCOSE UR STRIP.AUTO-MCNC: NEGATIVE MG/DL
HCT VFR BLD AUTO: 37.9 % (ref 36.6–50.3)
HGB BLD-MCNC: 12.3 G/DL (ref 12.1–17)
HGB UR QL STRIP: NEGATIVE
HYALINE CASTS URNS QL MICRO: NORMAL /LPF (ref 0–5)
IMM GRANULOCYTES # BLD: 0.1 K/UL (ref 0–0.04)
IMM GRANULOCYTES NFR BLD AUTO: 1 % (ref 0–0.5)
KETONES UR QL STRIP.AUTO: NEGATIVE MG/DL
LEUKOCYTE ESTERASE UR QL STRIP.AUTO: NEGATIVE
LYMPHOCYTES # BLD: 1.2 K/UL (ref 0.8–3.5)
LYMPHOCYTES NFR BLD: 12 % (ref 12–49)
MAGNESIUM SERPL-MCNC: 2 MG/DL (ref 1.6–2.4)
MAGNESIUM SERPL-MCNC: NORMAL MG/DL (ref 1.6–2.4)
MCH RBC QN AUTO: 32.1 PG (ref 26–34)
MCHC RBC AUTO-ENTMCNC: 32.5 G/DL (ref 30–36.5)
MCV RBC AUTO: 99 FL (ref 80–99)
MONOCYTES # BLD: 1 K/UL (ref 0–1)
MONOCYTES NFR BLD: 10 % (ref 5–13)
NEUTS SEG # BLD: 7.7 K/UL (ref 1.8–8)
NEUTS SEG NFR BLD: 77 % (ref 32–75)
NITRITE UR QL STRIP.AUTO: NEGATIVE
NRBC # BLD: 0.02 K/UL (ref 0–0.01)
NRBC BLD-RTO: 0.2 PER 100 WBC
PH UR STRIP: 7 [PH] (ref 5–8)
PLATELET # BLD AUTO: 191 K/UL (ref 150–400)
PMV BLD AUTO: 9.8 FL (ref 8.9–12.9)
POTASSIUM SERPL-SCNC: 4.1 MMOL/L (ref 3.5–5.1)
POTASSIUM SERPL-SCNC: ABNORMAL MMOL/L (ref 3.5–5.1)
PROT SERPL-MCNC: 7.3 G/DL (ref 6.4–8.2)
PROT UR STRIP-MCNC: NEGATIVE MG/DL
Q-T INTERVAL, ECG07: 380 MS
QRS DURATION, ECG06: 130 MS
QTC CALCULATION (BEZET), ECG08: 532 MS
RBC # BLD AUTO: 3.83 M/UL (ref 4.1–5.7)
RBC #/AREA URNS HPF: NORMAL /HPF (ref 0–5)
SERVICE CMNT-IMP: NORMAL
SODIUM SERPL-SCNC: 138 MMOL/L (ref 136–145)
SP GR UR REFRACTOMETRY: 1.01 (ref 1–1.03)
TROPONIN I SERPL-MCNC: 0.7 NG/ML
UR CULT HOLD, URHOLD: NORMAL
UROBILINOGEN UR QL STRIP.AUTO: 0.2 EU/DL (ref 0.2–1)
VENTRICULAR RATE, ECG03: 118 BPM
WBC # BLD AUTO: 10 K/UL (ref 4.1–11.1)
WBC URNS QL MICRO: NORMAL /HPF (ref 0–4)

## 2018-05-18 PROCEDURE — 83735 ASSAY OF MAGNESIUM: CPT | Performed by: PHYSICIAN ASSISTANT

## 2018-05-18 PROCEDURE — 36415 COLL VENOUS BLD VENIPUNCTURE: CPT | Performed by: PHYSICIAN ASSISTANT

## 2018-05-18 PROCEDURE — 74011250636 HC RX REV CODE- 250/636

## 2018-05-18 PROCEDURE — 93005 ELECTROCARDIOGRAM TRACING: CPT

## 2018-05-18 PROCEDURE — 75810000293 HC SIMP/SUPERF WND  RPR

## 2018-05-18 PROCEDURE — 84484 ASSAY OF TROPONIN QUANT: CPT | Performed by: PHYSICIAN ASSISTANT

## 2018-05-18 PROCEDURE — 77030018836 HC SOL IRR NACL ICUM -A

## 2018-05-18 PROCEDURE — 90715 TDAP VACCINE 7 YRS/> IM: CPT | Performed by: PHYSICIAN ASSISTANT

## 2018-05-18 PROCEDURE — 77030010507 HC ADH SKN DERMBND J&J -B

## 2018-05-18 PROCEDURE — 70450 CT HEAD/BRAIN W/O DYE: CPT

## 2018-05-18 PROCEDURE — 70486 CT MAXILLOFACIAL W/O DYE: CPT

## 2018-05-18 PROCEDURE — 82962 GLUCOSE BLOOD TEST: CPT

## 2018-05-18 PROCEDURE — 96361 HYDRATE IV INFUSION ADD-ON: CPT

## 2018-05-18 PROCEDURE — 96372 THER/PROPH/DIAG INJ SC/IM: CPT

## 2018-05-18 PROCEDURE — 99285 EMERGENCY DEPT VISIT HI MDM: CPT

## 2018-05-18 PROCEDURE — 81001 URINALYSIS AUTO W/SCOPE: CPT | Performed by: PHYSICIAN ASSISTANT

## 2018-05-18 PROCEDURE — 77030031132 HC SUT NYL COVD -A

## 2018-05-18 PROCEDURE — 84132 ASSAY OF SERUM POTASSIUM: CPT | Performed by: PHYSICIAN ASSISTANT

## 2018-05-18 PROCEDURE — 72125 CT NECK SPINE W/O DYE: CPT

## 2018-05-18 PROCEDURE — 85025 COMPLETE CBC W/AUTO DIFF WBC: CPT | Performed by: PHYSICIAN ASSISTANT

## 2018-05-18 PROCEDURE — 74011250636 HC RX REV CODE- 250/636: Performed by: PHYSICIAN ASSISTANT

## 2018-05-18 PROCEDURE — 74011000250 HC RX REV CODE- 250: Performed by: PHYSICIAN ASSISTANT

## 2018-05-18 PROCEDURE — 77030008460 HC STPLR SKN PRECIS 3M -A

## 2018-05-18 PROCEDURE — 96360 HYDRATION IV INFUSION INIT: CPT

## 2018-05-18 PROCEDURE — 74011000250 HC RX REV CODE- 250: Performed by: EMERGENCY MEDICINE

## 2018-05-18 PROCEDURE — 80053 COMPREHEN METABOLIC PANEL: CPT | Performed by: PHYSICIAN ASSISTANT

## 2018-05-18 PROCEDURE — 90471 IMMUNIZATION ADMIN: CPT

## 2018-05-18 RX ORDER — IBUPROFEN 800 MG/1
800 TABLET ORAL
Qty: 20 TAB | Refills: 0 | Status: SHIPPED | OUTPATIENT
Start: 2018-05-18

## 2018-05-18 RX ORDER — QUETIAPINE FUMARATE 25 MG/1
25 TABLET, FILM COATED ORAL
COMMUNITY

## 2018-05-18 RX ORDER — BACITRACIN 500 UNIT/G
1 PACKET (EA) TOPICAL
Status: COMPLETED | OUTPATIENT
Start: 2018-05-18 | End: 2018-05-18

## 2018-05-18 RX ORDER — QUETIAPINE FUMARATE 50 MG/1
50 TABLET, FILM COATED ORAL 2 TIMES DAILY
COMMUNITY

## 2018-05-18 RX ORDER — LIDOCAINE HYDROCHLORIDE AND EPINEPHRINE 10; 10 MG/ML; UG/ML
1.5 INJECTION, SOLUTION INFILTRATION; PERINEURAL ONCE
Status: COMPLETED | OUTPATIENT
Start: 2018-05-18 | End: 2018-05-18

## 2018-05-18 RX ORDER — MORPHINE SULFATE ORAL SOLUTION 10 MG/5ML
1.25 SOLUTION ORAL
COMMUNITY

## 2018-05-18 RX ORDER — SULFAMETHOXAZOLE AND TRIMETHOPRIM 800; 160 MG/1; MG/1
1 TABLET ORAL 2 TIMES DAILY
COMMUNITY

## 2018-05-18 RX ORDER — ZIPRASIDONE MESYLATE 20 MG/ML
INJECTION, POWDER, LYOPHILIZED, FOR SOLUTION INTRAMUSCULAR
Status: COMPLETED
Start: 2018-05-18 | End: 2018-05-18

## 2018-05-18 RX ORDER — ACETAMINOPHEN 500 MG
1000 TABLET ORAL
Qty: 30 TAB | Refills: 0 | Status: SHIPPED | OUTPATIENT
Start: 2018-05-18

## 2018-05-18 RX ORDER — ZIPRASIDONE MESYLATE 20 MG/ML
20 INJECTION, POWDER, LYOPHILIZED, FOR SOLUTION INTRAMUSCULAR
Status: COMPLETED | OUTPATIENT
Start: 2018-05-18 | End: 2018-05-18

## 2018-05-18 RX ADMIN — ZIPRASIDONE MESYLATE 20 MG: 20 INJECTION, POWDER, LYOPHILIZED, FOR SOLUTION INTRAMUSCULAR at 01:56

## 2018-05-18 RX ADMIN — BACITRACIN 1 PACKET: 500 OINTMENT TOPICAL at 03:14

## 2018-05-18 RX ADMIN — SODIUM CHLORIDE 500 ML: 900 INJECTION, SOLUTION INTRAVENOUS at 09:35

## 2018-05-18 RX ADMIN — TETANUS TOXOID, REDUCED DIPHTHERIA TOXOID AND ACELLULAR PERTUSSIS VACCINE, ADSORBED 0.5 ML: 5; 2.5; 8; 8; 2.5 SUSPENSION INTRAMUSCULAR at 11:50

## 2018-05-18 RX ADMIN — LIDOCAINE HYDROCHLORIDE AND EPINEPHRINE 15 MG: 10; 10 INJECTION, SOLUTION INFILTRATION; PERINEURAL at 02:13

## 2018-05-18 RX ADMIN — LIDOCAINE HYDROCHLORIDE AND EPINEPHRINE 15 MG: 10; 10 INJECTION, SOLUTION INFILTRATION; PERINEURAL at 13:35

## 2018-05-18 NOTE — ED NOTES
Bedside and Verbal shift change report given to Winifred RN  (oncoming nurse) by Nader Lundy RN  (offgoing nurse). Report included the following information SBAR. Notified by provider that magnesium and potassium need to be redrawn. Family updated on status.

## 2018-05-18 NOTE — ED TRIAGE NOTES
Patient with baseline dementia, multiple falls recently, lac to the back of head, and skin tear to the elbow.   Was here last night, and has sutures to the eyebrow over the right eye

## 2018-05-18 NOTE — PROGRESS NOTES
Spoke with pt's daughter, Margoth Amanda, at bedside. Pt is from [address] [place] [address] and is receiving hospice services from [facility]. Resources provided for private duty aide providers and [facility]. Pt's daughter inquiring about getting her father a hospital bed with rails. Contacted Annika Aragon with [facility] (c: [phone]). She is in the field but will notify the office so they can arrange for a bed.   Dianelys Quigley, CONNIEW

## 2018-05-18 NOTE — ED NOTES
Bedside and Verbal shift change report given to Winifred (oncoming nurse) by Ashanti Coronel (offgoing nurse). Report included the following information SBAR, Kardex and ED Summary. Introduced self to patient as primary nurse and assumed care. Pt's daughter at bedside. Pt confused at baseline. Materials management called for more dermabond, none located in [facility].

## 2018-05-18 NOTE — ED NOTES
Bedside and Verbal shift change report given to Misa aSntos (oncoming nurse) by Maueren Lara (offgoing nurse). Report included the following information SBAR, ED Summary, MAR and Recent Results.
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
## 2018-05-18 NOTE — DISCHARGE INSTRUCTIONS
We hope that we have addressed all of your medical concerns. The examination and treatment you received in the Emergency Department were for an emergent problem and were not intended as complete care. It is important that you follow up with your healthcare provider(s) for ongoing care. If your symptoms worsen or do not improve as expected, and you are unable to reach your usual health care provider(s), you should return to the Emergency Department. Today's healthcare is undergoing tremendous change, and patient satisfaction surveys are one of the many tools to assess the quality of medical care. You may receive a survey from the [employer] regarding your experience in the Emergency Department. I hope that your experience has been completely positive, particularly the medical care that I provided. As such, please participate in the survey; anything less than excellent does not meet my expectations or intentions. [facility] and [address] [place] [address] participate in nationally recognized quality of care measures. If your blood pressure is greater than 120/80, as reported below, we urge that you seek medical care to address the potential of high blood pressure, commonly known as hypertension. Hypertension can be hereditary or can be caused by certain medical conditions, pain, stress, or \"white coat syndrome. \"       Please make an appointment with your health care provider(s) for follow up of your Emergency Department visit. VITALS:   Patient Vitals for the past 8 hrs:   Pulse Resp BP SpO2   05/18/18 0300 - - 152/66 99 %   05/18/18 0250 - - 159/72 99 %   05/18/18 0157 (!) 50 18 (!) 133/97 99 %          Thank you for allowing us to provide you with medical care today. We realize that you have many choices for your emergency care needs. Please choose us in the future for any continued health care needs. Charis Maradiaga, MD    [address] [facility].   Office: [phone]            Recent Results (from the past 24 hour(s))   GLUCOSE, POC    Collection Time: 05/18/18  2:06 AM   Result Value Ref Range    Glucose (POC) 87 65 - 100 mg/dL    Performed by Keenan Choi        Ct Head Wo Cont    Result Date: 5/18/2018  EXAM:  CT HEAD WO CONT INDICATION:   Trauma. Patient found down on the floor with left forehead soft tissue injury. COMPARISON: None. CONTRAST:  None. TECHNIQUE: Unenhanced CT of the head was performed using 5 mm images. Brain and bone windows were generated. CT dose reduction was achieved through use of a standardized protocol tailored for this examination and automatic exposure control for dose modulation. FINDINGS: There is left scalp soft tissue swelling and laceration defect. There is no acute intracranial hemorrhage, mass, mass effect or herniation. Ventricles and sulci show stable, proportionate, and symmetric prominence. There is a stable pattern of periventricular white matter hypodensity. The gray-white matter differentiation is well-preserved. Atherosclerotic calcifications are seen within the carotid siphons and vertebral arteries. The mastoid air cells are well pneumatized. The visualized paranasal sinuses are normal.     IMPRESSION: Left frontal scalp swelling and laceration. No acute intracranial hemorrhage, mass or infarct. Stable pattern of atrophy and chronic white matter change most compatible with small vessel ischemic and/or senescent change. Intracranial atherosclerosis. Ct Maxillofacial Wo Cont    Result Date: 5/18/2018  EXAM:  CT MAXILLOFACIAL WO CONT INDICATION:   Trauma. Patient found down with left scalp laceration. COMPARISON:  None. CONTRAST:   None. TECHNIQUE:  Multislice helical CT of the facial bones was performed in the axial plane without intravenous contrast administration. Coronal and sagittal reformations were generated.   CT dose reduction was achieved through use of a standardized protocol tailored for this examination and automatic exposure control for dose modulation. FINDINGS: There is left frontal scalp swelling partially visualized. There is no facial fracture or other osseous abnormality. The visualized paranasal sinuses and mastoid air cells are clear. The globes, optic nerves and extraocular muscles are normal. No abnormalities are identified within the visualized portions of the brain or nasopharynx. Atherosclerotic calcifications are noted in the carotid arteries. Visualized cervical spine shows degenerative spine change. IMPRESSION: Left frontal scalp swelling. No acute fracture. Ct Spine Cerv Wo Cont    Result Date: 5/18/2018  EXAM:  CT CERVICAL SPINE WITHOUT CONTRAST INDICATION:   Trauma. Patient found down with left forehead laceration. COMPARISON: None. CONTRAST:  None. TECHNIQUE: Multislice helical CT of the cervical spine was performed without intravenous contrast administration. Sagittal and coronal reconstructions were generated. CT dose reduction was achieved through use of a standardized protocol tailored for this examination and automatic exposure control for dose modulation. FINDINGS: The alignment is within normal limits with left port convex cervical scoliosis. There is no fracture or subluxation. The odontoid process is intact. The craniocervical junction is within normal limits. The prevertebral soft tissues are within normal limits. The visualized lung apices show centrilobular septal thickening. C2-C3:  There is no spinal canal or neural foraminal stenosis. C3-C4:  There is right greater than left facet arthropathy and bilateral uncovertebral hypertrophy. Moderate right neural foraminal narrowing and minimal left neural foraminal narrowing. No spinal canal stenosis. C4-C5:  Right greater left facet arthropathy and uncovertebral hypertrophy. Moderate to severe right neural foraminal narrowing. Minimal left neural foraminal narrowing.  No spinal canal stenosis. C5-C6:  Prominent right greater than left uncovertebral hypertrophy with minimal facet arthropathy. Moderate to severe right neural foraminal narrowing. Minimal left neural foraminal narrowing. No spinal canal stenosis. C6-C7:  Bilateral uncovertebral hypertrophy and minimal facet arthropathy bilateral moderate neural foraminal narrowing. No spinal canal stenosis. . C7-T1:  There is no spinal canal or neural foraminal stenosis. IMPRESSION: No fracture or other acute finding. Multilevel degenerative spine changes and leftward cervical scoliosis as above. Suspect interstitial edema in the lungs.

## 2018-05-18 NOTE — ED NOTES
Transport called for pt to return to [facility]. Diet order placed, per Fany Urena, [address], and nutrition services called to bring pt tray prior to discharge. Pt's IV fluid bolus had been stopped prior to change of care and resumed at this time.

## 2018-05-18 NOTE — ED NOTES
Discharged back to the Nursing Home. Transport by [facility]. Wound to the left elbow wrapped in kerlix prior to departure. Family at bedside. Patient is no distress.

## 2018-05-18 NOTE — ED NOTES
Also was advised that the specimen was hemolyzed, and therefore some of the components of metabolic panel cannot be released

## 2018-05-18 NOTE — ED NOTES
TRANSFER - OUT REPORT:    Verbal report given to Rabia Howe(name) on [facility]  being transferred to [facility](unit) for routine progression of care       Report consisted of patients Situation, Background, Assessment and   Recommendations(SBAR). Information from the following report(s) SBAR, Kardex, ED Summary and Recent Results was reviewed with the receiving nurse. Lines:   Peripheral IV 05/18/18 Right Antecubital (Active)   Phlebitis Assessment 0 5/18/2018  9:34 AM   Infiltration Assessment 0 5/18/2018  9:34 AM   Dressing Status Clean, dry, & intact 5/18/2018  9:34 AM   Dressing Type Transparent 5/18/2018  9:34 AM   Hub Color/Line Status Pink;Flushed;Patent 5/18/2018  9:34 AM   Action Taken Blood drawn 5/18/2018  9:34 AM   Alcohol Cap Used Yes 5/18/2018  9:34 AM        Opportunity for questions and clarification was provided.       Patient transported with:   [email or web address]

## 2018-05-18 NOTE — ED PROVIDER NOTES
HPI Comments: 80 y.o. male with past medical history significant for dementia, HTN, XOL, CAD, s/p PCI and CABG, heart failure, s/p AVR, GERD, s/p right distal tib/fib fx with chronic deformity present, depression, who presents via EMS from [facility], for evaluation after an unwitnessed GLF. EMS estates that staff at [employer] heard a \"thump\" tonight from the patient's room, and they went into the room and found patient lying on the floor. Pt is unable to provide any history due to his baseline dementia. There is a laceration noted to the patient's right forehead, and it is unclear if patient lost consciousness during the fall. EMS placed a bandage on patient's head wound, and transported him to the ED. Pt was not complaining of any neck or back pain so they did not apply c-collar en route. Pt questioned in the ED if he has any neck or back pain, but is unable to say yes or no. Daughter is currently at bedside and states that patient is at his baseline mentation. She reports that patient is able to ambulate, but is typically unsteady on his feet at baseline. Daughter reports that patient is currently on ASA daily, but she denies any other anticoagulation therapy. She is unsure if patient's tetanus is up to date. Patient is currently a resident of the memory care unit at [facility], and he is also under the care of [facility]. He has a DNR in place. History is overall limited due to the patient's baseline dementia. Social hx: Negative for Tobacco use (former smoker); Negative for EtOH use; Negative for Illicit Drug Abuse    Resident of [facility] [address]    PCP: Arik Lyn MD    Full history, physical exam, and ROS unable to be obtained due to:  dementia. Note written by Goyo Perez, as dictated by Pieter Waite MD 2:02 AM     The history is provided by a relative, the EMS personnel and medical records.  The history is limited by the condition of the patient (baseline dementia). No  was used. Past Medical History:   Diagnosis Date    Arrhythmia     CAD (coronary artery disease)     Calculus of kidney     Contact dermatitis and other eczema, due to unspecified cause     Depression     Diverticula, intestine 2014    Fracture tibia/fibula     right    GERD (gastroesophageal reflux disease)     Heart failure (HCC)     Hiatal hernia     Hypercholesterolemia     Hypertension     Shingles 2014       Past Surgical History:   Procedure Laterality Date    CARDIAC SURG PROCEDURE UNLIST      HX AORTIC VALVE REPLACEMENT      HX CHOLECYSTECTOMY      HX CORONARY ARTERY BYPASS GRAFT      HX CORONARY STENT PLACEMENT      HX HEART VALVE SURGERY      HX ORTHOPAEDIC      HX VASECTOMY           Family History:   Problem Relation Age of Onset    Stroke Mother     Heart Disease Mother     Diabetes Father        Social History     Social History    Marital status:      Spouse name: N/A    Number of children: N/A    Years of education: N/A     Occupational History    Not on file. Social History Main Topics    Smoking status: Former Smoker     Quit date: 1/16/1945    Smokeless tobacco: Never Used    Alcohol use No      Comment: Occasionally    Drug use: No    Sexual activity: Not Currently      Comment:  has one child     Other Topics Concern    Not on file     Social History Narrative         ALLERGIES: Namenda [memantine]    Review of Systems   Unable to perform ROS: Dementia       Vitals:    05/18/18 0157   BP: (!) 133/97   Pulse: (!) 50   Resp: 18   SpO2: 99%   Weight: 72.6 kg (160 lb)   Height: 6' 2\" (1.88 m)            Physical Exam   Nursing note and vitals reviewed. CONSTITUTIONAL: Well-appearing; well-nourished; in mild distress; agitated and combative  HEAD: Normocephalic;  2 cm laceration in the right forehead  EYES: right periorbital ecchymosis;  PERRL; EOM intact; conjunctiva and sclera are clear bilaterally. ENT: No rhinorrhea; normal pharynx with no tonsillar hypertrophy; mucous membranes pink/moist, no erythema, no exudate. NECK: Supple; non-tender; no cervical lymphadenopathy  CARD: Normal S1, S2; no murmurs, rubs, or gallops. Regular rate and rhythm. RESP: Normal respiratory effort; breath sounds clear and equal bilaterally; no wheezes, rhonchi, or rales. ABD: Normal bowel sounds; non-distended; non-tender; no palpable organomegaly, no masses, no bruits. Back Exam: Normal inspection; no vertebral point tenderness, no CVA tenderness. Normal range of motion. EXT: Normal ROM in all four extremities; non-tender to palpation; no swelling or deformity; distal pulses are normal, no edema. SKIN: Warm; dry; no rash. NEURO:Alert and oriented x self only, chronically coherent, CHRISTINE-XII grossly intact, sensory and motor are non-focal.        MDM  Number of Diagnoses or Management Options  Diagnosis management comments: Assessment: 70-year-old male with history of moderate to severe dementia, gait dysfunction, with frequent falls presents to the ED for evaluation of fall with head and maxillofacial injury and facial laceration. The patient is DNR and on the hospice care. Plan: CT scan of the head, maxillofacial bones, C-spine/ Anxiolysis/ Wound care and laceration repair/ serial exam Monitor and Reevaluate.          Amount and/or Complexity of Data Reviewed  Clinical lab tests: ordered and reviewed  Tests in the radiology section of CPT®: ordered and reviewed  Tests in the medicine section of CPT®: reviewed and ordered  Discussion of test results with the performing providers: yes  Obtain history from someone other than the patient: yes  Discuss the patient with other providers: yes          ED Course       Wound Repair  Date/Time: 5/18/2018 3:23 AM  Performed by: attendingPreparation: skin prepped with ChloraPrep, skin prepped with Shur-Clens and sterile field established  Pre-procedure re-eval: Immediately prior to the procedure, the patient was reevaluated and found suitable for the planned procedure and any planned medications. Time out: Immediately prior to the procedure a time out was called to verify the correct patient, procedure, equipment, staff and marking as appropriate. .  Location details: face  Wound length:2.5 cm or less  Anesthesia: local infiltration    Anesthesia:  Local Anesthetic: lidocaine 1% with epinephrine  Anesthetic total: 5 mL  Foreign bodies: no foreign bodies  Irrigation solution: saline  Irrigation method: syringe  Debridement: none  Skin closure: 5-0 nylon  Number of sutures: 8  Technique: simple and interrupted  Approximation: close  Dressing: antibiotic ointment, 4x4 and pressure dressing  Patient tolerance: Patient tolerated the procedure well with no immediate complications  My total time at bedside, performing this procedure was 16-30 minutes. Progress Note:   Pt has been reexamined by Ondina Flores MD. Pt is feeling much better. Symptoms have improved. All available results have been reviewed with pt and any available family. Pt understands sx, dx, and tx in ED. Care plan has been outlined and questions have been answered. Pt is ready to go home. Will send home on Head injury/ Cervical strain/ contusion face and laceration face instruction. Prescription of Tylenol and Ibuprofen. Outpatient referral with PCP in 7-10 days for wound recheck and suture removal and further treatment as needed. Written by Ondina Flores MD,3:21 AM    .   .

## 2018-05-18 NOTE — DISCHARGE INSTRUCTIONS
Learning About Atrial Fibrillation  What is atrial fibrillation? Atrial fibrillation (say \"AY-tree-jarrell dyk-bfxa-RLU-shun\") is the most common type of irregular heartbeat (arrhythmia). Normally, the heart beats in a strong, steady rhythm. In atrial fibrillation, a problem with the heart's electrical system causes the two upper parts of the heart (the atria) to quiver, or fibrillate. Your heart rate also may be faster than normal.  Atrial fibrillation can be dangerous because if the heartbeat isn't strong and steady, blood can collect, or pool, in the atria. And pooled blood is more likely to form clots. Clots can travel to the brain, block blood flow, and cause a stroke. Atrial fibrillation can also lead to heart failure. Treatment for atrial fibrillation helps prevent stroke and heart failure. It also helps relieve symptoms. Atrial fibrillation is often caused by another heart problem. It may happen after heart surgery. It may also be caused by other problems, such as an overactive thyroid gland or lung disease. Many people with atrial fibrillation are able to live full and active lives. What are the symptoms? Some people feel symptoms when they have episodes of atrial fibrillation. But other people don't notice any symptoms. If you have symptoms, you may feel:  · A fluttering, racing, or pounding feeling in your chest called palpitations. · Weak or tired. · Dizzy or lightheaded. · Short of breath. · Chest pain. · Confused. You may notice signs of atrial fibrillation when you check your pulse. Your pulse may seem uneven or fast.  What can you expect when you have atrial fibrillation? At first, spells of atrial fibrillation may come on suddenly and last a short time. It may go away on its own or it goes away after treatment. This is called paroxysmal atrial fibrillation. Over time, the spells may last longer and occur more often. They often don't go away on their own.   How is it treated? Treatments can help you feel better and prevent future problems, especially stroke and heart failure. The main types of treatment slow the heart rate, control the heart rhythm, and help prevent stroke. Your treatment will depend on the cause of your atrial fibrillation, your symptoms, and your risk for stroke. · Heart rate treatment. Medicine may be used to slow your heart rate. Your heartbeat may still be irregular. But these medicines keep your heart from beating too fast. They may also help relieve your symptoms. · Heart rhythm treatment. Different treatments may be used to try to stop atrial fibrillation and keep it from returning. They can also relieve symptoms. These treatments include medicine, electrical cardioversion to shock the heart back to a normal rhythm, a procedure called catheter ablation, and heart surgery. · Stroke prevention. You and your doctor can decide how to lower your risk. You may decide to take a blood-thinning medicine, such as aspirin or an anticoagulant. How can you live well with it? You can live well and help manage atrial fibrillation by having a heart-healthy lifestyle. To have a heart-healthy lifestyle:  · Don't smoke. · Eat heart-healthy foods. · Be active. Talk to your doctor about what type and level of exercise is safe for you. · Stay at a healthy weight. Lose weight if you need to. · Manage stress. · Avoid alcohol if it triggers symptoms. · Manage other health problems such as high blood pressure, high cholesterol, and diabetes. · Avoid getting sick from the flu. Get a flu shot every year. Where can you learn more? Go to http://kathe-millicent.info/. Enter [phone] in the search box to learn more about \"Learning About Atrial Fibrillation. \"  Current as of: September 21, 2016  Content Version: 11.4  © 9329-9195 [employer].  Care instructions adapted under license by [employer] (which disclaims liability or warranty for this information). If you have questions about a medical condition or this instruction, always ask your healthcare professional. [address] any warranty or liability for your use of this information. Preventing Falls: Care Instructions  Your Care Instructions    Getting around your home safely can be a challenge if you have injuries or health problems that make it easy for you to fall. Loose rugs and furniture in walkways are among the dangers for many older people who have problems walking or who have poor eyesight. People who have conditions such as arthritis, osteoporosis, or dementia also have to be careful not to fall. You can make your home safer with a few simple measures. Follow-up care is a key part of your treatment and safety. Be sure to make and go to all appointments, and call your doctor if you are having problems. It's also a good idea to know your test results and keep a list of the medicines you take. How can you care for yourself at home? Taking care of yourself  · You may get dizzy if you do not drink enough water. To prevent dehydration, drink plenty of fluids, enough so that your urine is light yellow or clear like water. Choose water and other caffeine-free clear liquids. If you have kidney, heart, or liver disease and have to limit fluids, talk with your doctor before you increase the amount of fluids you drink. · Exercise regularly to improve your strength, muscle tone, and balance. Walk if you can. Swimming may be a good choice if you cannot walk easily. · Have your vision and hearing checked each year or any time you notice a change. If you have trouble seeing and hearing, you might not be able to avoid objects and could lose your balance. · Know the side effects of the medicines you take. Ask your doctor or pharmacist whether the medicines you take can affect your balance. Sleeping pills or sedatives can affect your balance.   · Limit the amount of alcohol you drink. Alcohol can impair your balance and other senses. · Ask your doctor whether calluses or corns on your feet need to be removed. If you wear loose-fitting shoes because of calluses or corns, you can lose your balance and fall. · Talk to your doctor if you have numbness in your feet. Preventing falls at home  · Remove raised doorway thresholds, throw rugs, and clutter. Repair loose carpet or raised areas in the floor. · Move furniture and electrical cords to keep them out of walking paths. · Use nonskid floor wax, and wipe up spills right away, especially on ceramic tile floors. · If you use a walker or cane, put rubber tips on it. If you use crutches, clean the bottoms of them regularly with an abrasive pad, such as steel wool. · Keep your house well lit, especially [place], and outside walkways. Use night-lights in areas such as hallways and bathrooms. Add extra light switches or use remote switches (such as switches that go on or off when you clap your hands) to make it easier to turn lights on if you have to get up during the night. · Install sturdy handrails on stairways. · Move items in your cabinets so that the things you use a lot are on the lower shelves (about waist level). · Keep a cordless phone and a flashlight with new batteries by your bed. If possible, put a phone in each of the main rooms of your house, or carry a cell phone in case you fall and cannot reach a phone. Or, you can wear a device around your neck or wrist. You push a button that sends a signal for help. · Wear low-heeled shoes that fit well and give your feet good support. Use footwear with nonskid soles. Check the heels and soles of your shoes for wear. Repair or replace worn heels or soles. · Do not wear socks without shoes on wood floors. · Walk on the grass when the sidewalks are slippery.  If you live in an area that gets snow and ice in the winter, sprinkle salt on slippery steps and sidewalks. Preventing falls in the bath  · Install grab bars and nonskid mats inside and outside your shower or tub and near the toilet and sinks. · Use shower chairs and bath benches. · Use a hand-held shower head that will allow you to sit while showering. · Get into a tub or shower by putting the weaker leg in first. Get out of a tub or shower with your strong side first.  · Repair loose toilet seats and consider installing a raised toilet seat to make getting on and off the toilet easier. · Keep your bathroom door unlocked while you are in the shower. Where can you learn more? Go to http://kathe-millicent.info/. Enter [phone] [id number] [phone] in the search box to learn more about \"Preventing Falls: Care Instructions. \"  Current as of: May 12, 2017  Content Version: 11.4  © 5148-2654 [employer]. Care instructions adapted under license by [employer] (which disclaims liability or warranty for this information). If you have questions about a medical condition or this instruction, always ask your healthcare professional. [place] [address] any warranty or liability for your use of this information. Learning About a Closed Head Injury  What is a closed head injury? A closed head injury happens when your head gets hit hard. The strong force of the blow causes your brain to shake in your skull. This movement can cause the brain to bruise, swell, or tear. Sometimes nerves or blood vessels also get damaged. This can cause bleeding in or around the brain. A concussion is a type of closed head injury. What are the symptoms? If you have a mild concussion, you may have a mild headache or feel \"not quite right. \" These symptoms are common. They usually go away over a few days to 4 weeks. But sometimes after a concussion, you feel like you can't function as well as before the injury. And you have new symptoms. This is called postconcussive syndrome.  You may:  · Find it harder to solve problems, think, concentrate, or remember. · Have headaches. · Have changes in your sleep patterns, such as not being able to sleep or sleeping all the time. · Have changes in your personality. · Not be interested in your usual activities. · Feel angry or anxious without a clear reason. · Lose your sense of taste or smell. · Be dizzy, lightheaded, or unsteady. It may be hard to stand or walk. How is a closed head injury treated? Any person who may have a concussion needs to see a doctor. Some people have to stay in the hospital to be watched. Others can go home safely. If you go home, follow your doctor's instructions. He or she will tell you if you need someone to watch you closely for the next 24 hours or longer. Rest is the best treatment. Get plenty of sleep at night. And try to rest during the day. · Avoid activities that are physically or mentally demanding. These include housework, exercise, and schoolwork. And don't play video games, send text messages, or use the computer. You may need to change your school or work schedule to be able to avoid these activities. · Ask your doctor when it's okay to drive, ride a bike, or operate machinery. · Take an over-the-counter pain medicine, such as acetaminophen (Tylenol), ibuprofen (Advil, Motrin), or naproxen (Aleve). Be safe with medicines. Read and follow all instructions on the label. · Check with your doctor before you use any other medicines for pain. · Do not drink alcohol or use illegal drugs. They can slow recovery. They can also increase your risk of getting a second head injury. Follow-up care is a key part of your treatment and safety. Be sure to make and go to all appointments, and call your doctor if you are having problems. It's also a good idea to know your test results and keep a list of the medicines you take. Where can you learn more? Go to http://kathe-millicent.info/.   Enter E235 in the search box to learn more about \"Learning About a Closed Head Injury. \"  Current as of: October 14, 2016  Content Version: 11.4  © 1975-9006 [employer]. Care instructions adapted under license by [employer] (which disclaims liability or warranty for this information). If you have questions about a medical condition or this instruction, always ask your healthcare professional. [address] any warranty or liability for your use of this information. Learning About a Closed Head Injury  What is a closed head injury? A closed head injury happens when your head gets hit hard. The strong force of the blow causes your brain to shake in your skull. This movement can cause the brain to bruise, swell, or tear. Sometimes nerves or blood vessels also get damaged. This can cause bleeding in or around the brain. A concussion is a type of closed head injury. What are the symptoms? If you have a mild concussion, you may have a mild headache or feel \"not quite right. \" These symptoms are common. They usually go away over a few days to 4 weeks. But sometimes after a concussion, you feel like you can't function as well as before the injury. And you have new symptoms. This is called postconcussive syndrome. You may:  · Find it harder to solve problems, think, concentrate, or remember. · Have headaches. · Have changes in your sleep patterns, such as not being able to sleep or sleeping all the time. · Have changes in your personality. · Not be interested in your usual activities. · Feel angry or anxious without a clear reason. · Lose your sense of taste or smell. · Be dizzy, lightheaded, or unsteady. It may be hard to stand or walk. How is a closed head injury treated? Any person who may have a concussion needs to see a doctor. Some people have to stay in the hospital to be watched. Others can go home safely. If you go home, follow your doctor's instructions.  He or she will tell you if you need someone to watch you closely for the next 24 hours or longer. Rest is the best treatment. Get plenty of sleep at night. And try to rest during the day. · Avoid activities that are physically or mentally demanding. These include housework, exercise, and schoolwork. And don't play video games, send text messages, or use the computer. You may need to change your school or work schedule to be able to avoid these activities. · Ask your doctor when it's okay to drive, ride a bike, or operate machinery. · Take an over-the-counter pain medicine, such as acetaminophen (Tylenol), ibuprofen (Advil, Motrin), or naproxen (Aleve). Be safe with medicines. Read and follow all instructions on the label. · Check with your doctor before you use any other medicines for pain. · Do not drink alcohol or use illegal drugs. They can slow recovery. They can also increase your risk of getting a second head injury. Follow-up care is a key part of your treatment and safety. Be sure to make and go to all appointments, and call your doctor if you are having problems. It's also a good idea to know your test results and keep a list of the medicines you take. Where can you learn more? Go to http://kathe-millicent.info/. Enter E235 in the search box to learn more about \"Learning About a Closed Head Injury. \"  Current as of: October 14, 2016  Content Version: 11.4  © 5396-1437 [employer]. Care instructions adapted under license by [employer] (which disclaims liability or warranty for this information). If you have questions about a medical condition or this instruction, always ask your healthcare professional. [place] [address] any warranty or liability for your use of this information. Cuts Closed With Staples: Care Instructions  Your Care Instructions  A cut can happen anywhere on your body. The doctor used staples to close the cut.  Staples easily and quickly close a cut, which helps the cut heal.  Sometimes a cut can injure tendons, blood vessels, or nerves. If the cut went deep and through the skin, the doctor may have put in a layer of stitches below the staples. The deeper layer of stitches brings the deep part of the cut together. These stitches will dissolve and don't need to be removed. The staples in the upper layer are what you see on the cut. You may have a bandage. You will need to have the staples removed, usually in 7 to 14 days. The doctor has checked you carefully, but problems can develop later. If you notice any problems or new symptoms, get medical treatment right away. Follow-up care is a key part of your treatment and safety. Be sure to make and go to all appointments, and call your doctor if you are having problems. It's also a good idea to know your test results and keep a list of the medicines you take. How can you care for yourself at home? · Keep the cut dry for the first 24 to 48 hours. After this, you can shower if your doctor okays it. Pat the cut dry. · Don't soak the cut, such as in a bathtub. Your doctor will tell you when it's safe to get the cut wet. · If your doctor told you how to care for your cut, follow your doctor's instructions. If you did not get instructions, follow this general advice:  ¨ After the first 24 to 48 hours, wash around the cut with clean water 2 times a day. Don't use hydrogen peroxide or alcohol, which can slow healing. ¨ You may cover the cut with a thin layer of petroleum jelly, such as Vaseline, and a nonstick bandage. ¨ Apply more petroleum jelly and replace the bandage as needed. · Avoid any activity that could cause your cut to reopen. · Do not remove the staples on your own. Your doctor will tell you when to come back to have the staples removed. · Take pain medicines exactly as directed. ¨ If the doctor gave you a prescription medicine for pain, take it as prescribed.   ¨ If you are not taking a prescription pain medicine, ask your doctor if you can take an over-the-counter medicine. When should you call for help? Call your doctor now or seek immediate medical care if:  ? · You have new pain, or your pain gets worse. ? · The skin near the cut is cold or pale or changes color. ? · You have tingling, weakness, or numbness near the cut.   ? · The cut starts to bleed, and blood soaks through the bandage. Oozing small amounts of blood is normal.   ? · You have trouble moving the area near the cut.   ? · You have symptoms of infection, such as:  ¨ Increased pain, swelling, warmth, or redness around the cut. ¨ Red streaks leading from the cut. ¨ Pus draining from the cut. ¨ A fever. ? Watch closely for changes in your health, and be sure to contact your doctor if:  ? · You do not get better as expected. Where can you learn more? Go to http://katheSpitogatos.grmillicent.info/. Enter A402 in the search box to learn more about \"Cuts Closed With Staples: Care Instructions. \"  Current as of: March 20, 2017  Content Version: 11.4  © 7875-4528 [employer]. Care instructions adapted under license by [employer] (which disclaims liability or warranty for this information). If you have questions about a medical condition or this instruction, always ask your healthcare professional. [address] any warranty or liability for your use of this information. Skin Tears: Care Instructions  Your Care Instructions  As we get older, our skin gets drier and more fragile. Sometimes this can cause the outer layers of skin to split and tear open. Skin tears are treated in different ways. In some cases, doctors use pieces of tape called Steri-Strips to pull the skin together and help it heal. Other times, it's best to leave the tear open and cover it with a special wound-care bandage. Skin tears are usually not serious. They usually heal in a few weeks.  But how long you take to heal depends on your body and the type of tear you have. Sometimes the torn piece of skin is used to protect the wound while it heals. But that piece of skin does not heal. It may fall off on its own. Or the doctor may remove it. As your tear heals, it's important to keep it clean to help prevent infection. The doctor has checked you carefully, but problems can develop later. If you notice any problems or new symptoms, get medical treatment right away. Follow-up care is a key part of your treatment and safety. Be sure to make and go to all appointments, and call your doctor if you are having problems. It's also a good idea to know your test results and keep a list of the medicines you take. How can you care for yourself at home? · If you have pain, ask your doctor if you can take an over-the-counter pain medicine, such as acetaminophen (Tylenol), ibuprofen (Advil, Motrin), or naproxen (Aleve). Be safe with medicines. Read and follow all instructions on the label. · If you have a bandage, follow your doctor's instructions for changing it. · If you have Steri-Strips, leave them on until they fall off. · Follow your doctor's instructions about bathing. · Gently wash the skin tear with plain water 2 times a day. Do not rub the area. · Let the area air dry. Or you can pat it carefully with a soft towel. When should you call for help? Call your doctor now or seek immediate medical care if:  ? · You have signs of infection, such as:  ¨ Increased pain, swelling, warmth, or redness around the tear. ¨ Red streaks leading from the tear. ¨ Pus draining from the tear. ¨ A fever. ? · The tear starts to bleed a lot. Small amounts of blood are normal.   ? Watch closely for changes in your health, and be sure to contact your doctor if:  ? · You do not get better as expected. Where can you learn more? Go to http://kathe-millicent.info/.   Enter M083 in the search box to learn more about \"Skin Tears: Care Instructions. \"  Current as of: March 20, 2017  Content Version: 11.4  © 1967-1350 [employer]. Care instructions adapted under license by [employer] (which disclaims liability or warranty for this information). If you have questions about a medical condition or this instruction, always ask your healthcare professional. [address] any warranty or liability for your use of this information. We hope that we have addressed all of your medical concerns. The examination and treatment you received in the Emergency Department were for an emergent problem and were not intended as complete care. It is important that you follow up with your healthcare provider(s) for ongoing care. If your symptoms worsen or do not improve as expected, and you are unable to reach your usual health care provider(s), you should return to the Emergency Department. Today's healthcare is undergoing tremendous change, and patient satisfaction surveys are one of the many tools to assess the quality of medical care. You may receive a survey from the [employer] regarding your experience in the Emergency Department. I hope that your experience has been completely positive, particularly the medical care that I provided. As such, please participate in the survey; anything less than excellent does not meet my expectations or intentions. [facility] participate in nationally recognized quality of care measures. If your blood pressure is greater than 120/80, as reported below, we urge that you seek medical care to address the potential of high blood pressure, commonly known as hypertension. Hypertension can be hereditary or can be caused by certain medical conditions, pain, stress, or \"white coat syndrome. \"       Please make an appointment with your health care provider(s) for follow up of your Emergency Department visit. VITALS:   Patient Vitals for the past 8 hrs:   Temp Pulse Resp BP SpO2   05/18/18 1232 - (!) 44 18 - -   05/18/18 1227 - - - 185/59 -   05/18/18 1100 - 77 15 - -   05/18/18 1000 - 91 19 - -   05/18/18 0815 98.2 °F (36.8 °C) 63 14 (!) 177/108 97 %          Thank you for allowing us to provide you with medical care today. We realize that you have many choices for your emergency care needs. Please choose us in the future for any continued health care needs. Rj Hutton, [address] [place] [address].   Office: [phone]            Recent Results (from the past 24 hour(s))   GLUCOSE, POC    Collection Time: 05/18/18  2:06 AM   Result Value Ref Range    Glucose (POC) 87 65 - 100 mg/dL    Performed by Mario Shallow    CBC WITH AUTOMATED DIFF    Collection Time: 05/18/18  9:09 AM   Result Value Ref Range    WBC 10.0 4.1 - 11.1 K/uL    RBC 3.83 (L) 4.10 - 5.70 M/uL    HGB 12.3 12.1 - 17.0 g/dL    HCT 37.9 36.6 - 50.3 %    MCV 99.0 80.0 - 99.0 FL    MCH 32.1 26.0 - 34.0 PG    MCHC 32.5 30.0 - 36.5 g/dL    RDW 14.2 11.5 - 14.5 %    PLATELET 582 999 - 700 K/uL    MPV 9.8 8.9 - 12.9 FL    NRBC 0.2 (H) 0  WBC    ABSOLUTE NRBC 0.02 (H) 0.00 - 0.01 K/uL    NEUTROPHILS 77 (H) 32 - 75 %    LYMPHOCYTES 12 12 - 49 %    MONOCYTES 10 5 - 13 %    EOSINOPHILS 1 0 - 7 %    BASOPHILS 1 0 - 1 %    IMMATURE GRANULOCYTES 1 (H) 0.0 - 0.5 %    ABS. NEUTROPHILS 7.7 1.8 - 8.0 K/UL    ABS. LYMPHOCYTES 1.2 0.8 - 3.5 K/UL    ABS. MONOCYTES 1.0 0.0 - 1.0 K/UL    ABS. EOSINOPHILS 0.1 0.0 - 0.4 K/UL    ABS. BASOPHILS 0.1 0.0 - 0.1 K/UL    ABS. IMM.  GRANS. 0.1 (H) 0.00 - 0.04 K/UL    DF AUTOMATED     METABOLIC PANEL, COMPREHENSIVE    Collection Time: 05/18/18  9:09 AM   Result Value Ref Range    Sodium 138 136 - 145 mmol/L    Potassium HEMOLYZED,RECOLLECT REQUESTED 3.5 - 5.1 mmol/L    Chloride 105 97 - 108 mmol/L    CO2 28 21 - 32 mmol/L    Anion gap 5 5 - 15 mmol/L    Glucose 128 (H) 65 - 100 mg/dL    BUN 22 (H) 6 - 20 MG/DL    Creatinine 1.00 0.70 - 1.30 MG/DL    BUN/Creatinine ratio 22 (H) 12 - 20      GFR est AA >60 >60 ml/min/1.73m2    GFR est non-AA >60 >60 ml/min/1.73m2    Calcium 9.0 8.5 - 10.1 MG/DL    Bilirubin, total 0.7 0.2 - 1.0 MG/DL    ALT (SGPT) 26 12 - 78 U/L    AST (SGOT) HEMOLYZED,RECOLLECT REQUESTED 15 - 37 U/L    Alk.  phosphatase 56 45 - 117 U/L    Protein, total 7.3 6.4 - 8.2 g/dL    Albumin 3.6 3.5 - 5.0 g/dL    Globulin 3.7 2.0 - 4.0 g/dL    A-G Ratio 1.0 (L) 1.1 - 2.2     TROPONIN I    Collection Time: 05/18/18  9:09 AM   Result Value Ref Range    Troponin-I, Qt. 0.70 (H) <0.05 ng/mL   MAGNESIUM    Collection Time: 05/18/18  9:09 AM   Result Value Ref Range    Magnesium HEMOLYZED,RECOLLECT REQUESTED 1.6 - 2.4 mg/dL   EKG, 12 LEAD, INITIAL    Collection Time: 05/18/18  9:22 AM   Result Value Ref Range    Ventricular Rate 118 BPM    Atrial Rate 115 BPM    QRS Duration 130 ms    Q-T Interval 380 ms    QTC Calculation (Bezet) 532 ms    Calculated R Axis 79 degrees    Calculated T Axis -107 degrees    Diagnosis       Atrial fibrillation with rapid ventricular response with premature   ventricular or aberrantly conducted complexes  Nonspecific intraventricular block  Lateral infarct , age undetermined  Cannot rule out Inferior infarct , age undetermined  Abnormal ECG  When compared with ECG of 25-JAN-2014 15:04,  Significant changes have occurred     URINALYSIS W/MICROSCOPIC    Collection Time: 05/18/18  9:27 AM   Result Value Ref Range    Color YELLOW/STRAW      Appearance CLEAR CLEAR      Specific gravity 1.009 1.003 - 1.030      pH (UA) 7.0 5.0 - 8.0      Protein NEGATIVE  NEG mg/dL    Glucose NEGATIVE  NEG mg/dL    Ketone NEGATIVE  NEG mg/dL    Bilirubin NEGATIVE  NEG      Blood NEGATIVE  NEG      Urobilinogen 0.2 0.2 - 1.0 EU/dL    Nitrites NEGATIVE  NEG      Leukocyte Esterase NEGATIVE  NEG      WBC 0-4 0 - 4 /hpf    RBC 0-5 0 - 5 /hpf Epithelial cells FEW FEW /lpf    Bacteria NEGATIVE  NEG /hpf    Hyaline cast 0-2 0 - 5 /lpf   URINE CULTURE HOLD SAMPLE    Collection Time: 05/18/18  9:27 AM   Result Value Ref Range    Urine culture hold        URINE ON HOLD IN MICROBIOLOGY DEPT FOR 3 DAYS. IF UNPRESERVED URINE IS SUBMITTED, IT CANNOT BE USED FOR ADDITIONAL TESTING AFTER 24 HRS, RECOLLECTION WILL BE REQUIRED. POTASSIUM    Collection Time: 05/18/18 11:48 AM   Result Value Ref Range    Potassium 4.1 3.5 - 5.1 mmol/L   MAGNESIUM    Collection Time: 05/18/18 11:48 AM   Result Value Ref Range    Magnesium 2.0 1.6 - 2.4 mg/dL       Ct Head Wo Cont    Result Date: 5/18/2018  EXAM:  CT HEAD WO CONT INDICATION:   Ground-level fall twice in 12 hours with head injury x 2 COMPARISON: 5/18/2018 at 2:33 AM. CONTRAST:  None. TECHNIQUE: Unenhanced CT of the head was performed using 5 mm images. Brain and bone windows were generated. CT dose reduction was achieved through use of a standardized protocol tailored for this examination and automatic exposure control for dose modulation. FINDINGS: The ventricles and sulci are stable in size, shape and configuration and midline. There is unchanged periventricular white matter disease. There is no intracranial hemorrhage, extra-axial collection, mass, mass effect or midline shift. The basilar cisterns are open. No acute infarct is identified. The bone windows demonstrate no abnormalities. The visualized portions of the paranasal sinuses and mastoid air cells are clear. IMPRESSION: No acute intracranial process. No change from the CT from earlier today. Ct Head Wo Cont    Result Date: 5/18/2018  EXAM:  CT HEAD WO CONT INDICATION:   Trauma. Patient found down on the floor with left forehead soft tissue injury. COMPARISON: None. CONTRAST:  None. TECHNIQUE: Unenhanced CT of the head was performed using 5 mm images. Brain and bone windows were generated.   CT dose reduction was achieved through use of a standardized protocol tailored for this examination and automatic exposure control for dose modulation. FINDINGS: There is left scalp soft tissue swelling and laceration defect. There is no acute intracranial hemorrhage, mass, mass effect or herniation. Ventricles and sulci show stable, proportionate, and symmetric prominence. There is a stable pattern of periventricular white matter hypodensity. The gray-white matter differentiation is well-preserved. Atherosclerotic calcifications are seen within the carotid siphons and vertebral arteries. The mastoid air cells are well pneumatized. The visualized paranasal sinuses are normal.     IMPRESSION: Left frontal scalp swelling and laceration. No acute intracranial hemorrhage, mass or infarct. Stable pattern of atrophy and chronic white matter change most compatible with small vessel ischemic and/or senescent change. Intracranial atherosclerosis. Ct Maxillofacial Wo Cont    Result Date: 5/18/2018  EXAM:  CT MAXILLOFACIAL WO CONT INDICATION:   Trauma. Patient found down with left scalp laceration. COMPARISON:  None. CONTRAST:   None. TECHNIQUE:  Multislice helical CT of the facial bones was performed in the axial plane without intravenous contrast administration. Coronal and sagittal reformations were generated. CT dose reduction was achieved through use of a standardized protocol tailored for this examination and automatic exposure control for dose modulation. FINDINGS: There is left frontal scalp swelling partially visualized. There is no facial fracture or other osseous abnormality. The visualized paranasal sinuses and mastoid air cells are clear. The globes, optic nerves and extraocular muscles are normal. No abnormalities are identified within the visualized portions of the brain or nasopharynx. Atherosclerotic calcifications are noted in the carotid arteries. Visualized cervical spine shows degenerative spine change.      IMPRESSION: Left frontal scalp swelling. No acute fracture. Ct Spine Cerv Wo Cont    Result Date: 5/18/2018  EXAM:  CT CERVICAL SPINE WITHOUT CONTRAST INDICATION:   fall, head injury x 2 in 12 hours. COMPARISON: 5/18/2018 at 2:43 AM CONTRAST:  None. TECHNIQUE: Multislice helical CT of the cervical spine was performed at 9:50 AM without intravenous contrast administration. Sagittal and coronal reconstructions were generated. CT dose reduction was achieved through use of a standardized protocol tailored for this examination and automatic exposure control for dose modulation. FINDINGS: The alignment is unchanged. There is no fracture or subluxation. The odontoid process is intact. The craniocervical junction is within normal limits. Multilevel degenerative changes are again noted. The prevertebral soft tissues are within normal limits. IMPRESSION: No acute fracture or subluxation. No change from the cervical spine CT from earlier today. Ct Spine Cerv Wo Cont    Result Date: 5/18/2018  EXAM:  CT CERVICAL SPINE WITHOUT CONTRAST INDICATION:   Trauma. Patient found down with left forehead laceration. COMPARISON: None. CONTRAST:  None. TECHNIQUE: Multislice helical CT of the cervical spine was performed without intravenous contrast administration. Sagittal and coronal reconstructions were generated. CT dose reduction was achieved through use of a standardized protocol tailored for this examination and automatic exposure control for dose modulation. FINDINGS: The alignment is within normal limits with left port convex cervical scoliosis. There is no fracture or subluxation. The odontoid process is intact. The craniocervical junction is within normal limits. The prevertebral soft tissues are within normal limits. The visualized lung apices show centrilobular septal thickening. C2-C3:  There is no spinal canal or neural foraminal stenosis. C3-C4:  There is right greater than left facet arthropathy and bilateral uncovertebral hypertrophy. Moderate right neural foraminal narrowing and minimal left neural foraminal narrowing. No spinal canal stenosis. C4-C5:  Right greater left facet arthropathy and uncovertebral hypertrophy. Moderate to severe right neural foraminal narrowing. Minimal left neural foraminal narrowing. No spinal canal stenosis. C5-C6:  Prominent right greater than left uncovertebral hypertrophy with minimal facet arthropathy. Moderate to severe right neural foraminal narrowing. Minimal left neural foraminal narrowing. No spinal canal stenosis. C6-C7:  Bilateral uncovertebral hypertrophy and minimal facet arthropathy bilateral moderate neural foraminal narrowing. No spinal canal stenosis. . C7-T1:  There is no spinal canal or neural foraminal stenosis. IMPRESSION: No fracture or other acute finding. Multilevel degenerative spine changes and leftward cervical scoliosis as above. Suspect interstitial edema in the lungs.

## 2018-05-18 NOTE — ED PROVIDER NOTES
HPI Comments: 80 y.o. male with extensive past medical history, please see list, significant for HTN, CAD, heart failure, hypercholesterolemia, diverticulosis, arrhythmia, calculus of kidney, GERD, right tibia/fibula fracture, and depression who presents to the ED via EMS for evaluation following a GLF. Pt's daughter reports pt had a GLF last night while getting out of bed to go to the bathroom at about 0100, says he hit his head and was seen in the ED for evaluation and had sutures placed to repair a laceration on his right forehead. Pt's daughter states pt was discharged back to the facility and was sitting at breakfast this morning when he had another unwitnessed GLF and hit his head. Pt's daughter states pt now has a laceration to his posterior scalp and a skin tear to his left elbow secondary to the GLF. Pt's daughter states pt has confusion at baseline due to hx of dementia and says his mental status is currently unchanged. Pt's daughter states pt also has an unsteady gait at baseline. Pt's daughter states pt has hx of right tibia/fibula fracture with a chronic deformity. Pt is on hospice. Pt's last tetanus is unknown. Pt denies neck pain or arm pain. Pt denies fever, chills, chest pain, SOB, nausea, vomiting, diarrhea, or abdominal pain. There are no other acute medical complaints voiced at this time. Full history, physical exam, and ROS unable to be obtained due to: dementia. Social Hx: Former smoker. Lives at [facility] (since November). PCP: Arik Lyn MD    Note written by Heather Peralta, as dictated by Karsten Apgar, PA 8:32 AM     The history is provided by the patient and a relative (daughter). The history is limited by the condition of the patient (baseline dementia).         Past Medical History:   Diagnosis Date    Arrhythmia     CAD (coronary artery disease)     Calculus of kidney     Contact dermatitis and other eczema, due to unspecified cause     Depression     Diverticula, intestine 2014    Fracture tibia/fibula     right    GERD (gastroesophageal reflux disease)     Heart failure (HCC)     Hiatal hernia     Hypercholesterolemia     Hypertension     Shingles 2014       Past Surgical History:   Procedure Laterality Date    CARDIAC SURG PROCEDURE UNLIST      HX AORTIC VALVE REPLACEMENT      HX CHOLECYSTECTOMY      HX CORONARY ARTERY BYPASS GRAFT      HX CORONARY STENT PLACEMENT      HX HEART VALVE SURGERY      HX ORTHOPAEDIC      HX VASECTOMY           Family History:   Problem Relation Age of Onset    Stroke Mother     Heart Disease Mother     Diabetes Father        Social History     Social History    Marital status:      Spouse name: N/A    Number of children: N/A    Years of education: N/A     Occupational History    Not on file. Social History Main Topics    Smoking status: Former Smoker     Quit date: 1/16/1945    Smokeless tobacco: Never Used    Alcohol use No      Comment: Occasionally    Drug use: No    Sexual activity: Not Currently      Comment:  has one child     Other Topics Concern    Not on file     Social History Narrative         ALLERGIES: Namenda [memantine]    Review of Systems   Unable to perform ROS: Dementia       Patient Vitals for the past 12 hrs:   Temp Pulse Resp BP SpO2   05/18/18 1526 - - 20 154/73 90 %   05/18/18 1232 - (!) 44 18 - -   05/18/18 1227 - - - 185/59 -   05/18/18 1100 - 77 15 - -   05/18/18 1000 - 91 19 - -   05/18/18 0815 98.2 °F (36.8 °C) 63 14 (!) 177/108 97 %              Physical Exam   Constitutional: He appears well-developed and well-nourished. No distress. Elderly male    HENT:   Head: Normocephalic. Right Ear: External ear normal.   Left Ear: External ear normal.   Sutured, ecchymotic and swollen wound to right eyebrow. + laceration to left occiput. No bony ttp   Eyes: EOM are normal. Pupils are equal, round, and reactive to light.  Right eye exhibits no discharge. Left eye exhibits no discharge. Neck: Neck supple. No JVD present. No tracheal deviation present. Non-tender to midline and throughout. No swelling or step off. No discoloration or deformity. No lesions. Moves neck full ROM without diff against resistance.  5/5 bilaterally. Cardiovascular: Normal rate, regular rhythm, normal heart sounds and intact distal pulses. Exam reveals no gallop and no friction rub. No murmur heard. Pulmonary/Chest: Effort normal and breath sounds normal. No stridor. No respiratory distress. He has no wheezes. He has no rales. He exhibits no tenderness. Abdominal: Soft. Bowel sounds are normal. He exhibits no distension and no mass. There is no tenderness. There is no rebound and no guarding. Musculoskeletal: Normal range of motion. He exhibits deformity. He exhibits no edema or tenderness. Skin tear 3 cm to left elbow, no bony ttp. Distal n/v intact. Chronic deformity to right lower leg. Non tender. Baseline per daughter   Lymphadenopathy:     He has no cervical adenopathy. Neurological: He is alert. No cranial nerve deficit. Coordination normal.   Oriented to person, not place or time. Baseline dementia per daughter   Skin: No rash noted. No erythema. No pallor. Nursing note and vitals reviewed. MDM  Number of Diagnoses or Management Options     Amount and/or Complexity of Data Reviewed  Clinical lab tests: reviewed and ordered  Tests in the radiology section of CPT®: ordered and reviewed  Tests in the medicine section of CPT®: ordered and reviewed  Obtain history from someone other than the patient: yes (Daughter, ems)  Review and summarize past medical records: yes  Independent visualization of images, tracings, or specimens: yes    Patient Progress  Patient progress: stable        ED Course       Procedures  9:09 AM  Discussed pt, sx, hx and current findings with Elida Branham MD. He is in agreement with plan and will see pt.  Will get repeat ct head, cs pine, labs, ekg, urine and update tdap. Will repair wounds  Shelby Hutton PA-C     ED EKG interpretation: 9:22 AM  Rhythm: atrial fib with RVR, non specific intraventricular block, PVC's ; and irregular. Rate (approx.): 118; Axis: normal;  QRS interval: normal ; ST/T wave: non-specific changes; Other findings: abnormal ekg. This EKG was interpreted by Rusty Davidson MD,ED Provider. PROGRESS NOTE:  10:14 AM   Dr. Avinash Granados spoke to pt's daughter. She is concerned due to sudden decline in pt's mental status. Would like to consider possible admission after discussing with case management and hospice. Spoke to  at pt's current facility after initial fall. Recommending 24 hour care. Procedure Note - Laceration Repair:  12:55 PM  Procedure by Shelby Hutton PA-C. Complexity: complex  3cm v-shaped laceration to arm  was irrigated copiously with NS under jet lavage, prepped with surecleans and draped in a sterile fashion. No anesthesia was used The wound was explored with the following results: No foreign bodies found. The wound was repaired with Dermabond. The wound was closed with good hemostasis and approximation. Sterile dressing applied. Estimated blood loss: less than 1mL  The procedure took: 15 minutes, and pt tolerated well. Procedure Note - Laceration Repair:  12:55 PM  Procedure by Shelby Hutton PA-C. Complexity: simple  2 cm linear laceration to scalp  was irrigated copiously with NS under jet lavage, prepped with surecleans and draped in a sterile fashion. The area was anesthetized with 3 mLs of  Lidocaine 1% with epinephrine via local infiltration. The wound was explored with the following results: No foreign bodies found. The wound was repaired with 5 staples. The wound was closed with good hemostasis and approximation. Sterile dressing applied.   Estimated blood loss: less than 2mL  The procedure took 15 minutes, and pt tolerated well.    1:12 PM   pt's family would like pt to return to [facility] as they do not want to pursue other treatment in patient . Pt to be discharged to memory care [place]. Labs/ imaging reviewed with pt's family. Pt eating without difficulty. Shelby Anderson. TRACY Hutton    LABORATORY TESTS:  Recent Results (from the past 12 hour(s))   CBC WITH AUTOMATED DIFF    Collection Time: 05/18/18  9:09 AM   Result Value Ref Range    WBC 10.0 4.1 - 11.1 K/uL    RBC 3.83 (L) 4.10 - 5.70 M/uL    HGB 12.3 12.1 - 17.0 g/dL    HCT 37.9 36.6 - 50.3 %    MCV 99.0 80.0 - 99.0 FL    MCH 32.1 26.0 - 34.0 PG    MCHC 32.5 30.0 - 36.5 g/dL    RDW 14.2 11.5 - 14.5 %    PLATELET 615 719 - 695 K/uL    MPV 9.8 8.9 - 12.9 FL    NRBC 0.2 (H) 0  WBC    ABSOLUTE NRBC 0.02 (H) 0.00 - 0.01 K/uL    NEUTROPHILS 77 (H) 32 - 75 %    LYMPHOCYTES 12 12 - 49 %    MONOCYTES 10 5 - 13 %    EOSINOPHILS 1 0 - 7 %    BASOPHILS 1 0 - 1 %    IMMATURE GRANULOCYTES 1 (H) 0.0 - 0.5 %    ABS. NEUTROPHILS 7.7 1.8 - 8.0 K/UL    ABS. LYMPHOCYTES 1.2 0.8 - 3.5 K/UL    ABS. MONOCYTES 1.0 0.0 - 1.0 K/UL    ABS. EOSINOPHILS 0.1 0.0 - 0.4 K/UL    ABS. BASOPHILS 0.1 0.0 - 0.1 K/UL    ABS. IMM. GRANS. 0.1 (H) 0.00 - 0.04 K/UL    DF AUTOMATED     METABOLIC PANEL, COMPREHENSIVE    Collection Time: 05/18/18  9:09 AM   Result Value Ref Range    Sodium 138 136 - 145 mmol/L    Potassium HEMOLYZED,RECOLLECT REQUESTED 3.5 - 5.1 mmol/L    Chloride 105 97 - 108 mmol/L    CO2 28 21 - 32 mmol/L    Anion gap 5 5 - 15 mmol/L    Glucose 128 (H) 65 - 100 mg/dL    BUN 22 (H) 6 - 20 MG/DL    Creatinine 1.00 0.70 - 1.30 MG/DL    BUN/Creatinine ratio 22 (H) 12 - 20      GFR est AA >60 >60 ml/min/1.73m2    GFR est non-AA >60 >60 ml/min/1.73m2    Calcium 9.0 8.5 - 10.1 MG/DL    Bilirubin, total 0.7 0.2 - 1.0 MG/DL    ALT (SGPT) 26 12 - 78 U/L    AST (SGOT) HEMOLYZED,RECOLLECT REQUESTED 15 - 37 U/L    Alk.  phosphatase 56 45 - 117 U/L    Protein, total 7.3 6.4 - 8.2 g/dL    Albumin 3.6 3.5 - 5.0 g/dL Globulin 3.7 2.0 - 4.0 g/dL    A-G Ratio 1.0 (L) 1.1 - 2.2     TROPONIN I    Collection Time: 05/18/18  9:09 AM   Result Value Ref Range    Troponin-I, Qt. 0.70 (H) <0.05 ng/mL   MAGNESIUM    Collection Time: 05/18/18  9:09 AM   Result Value Ref Range    Magnesium HEMOLYZED,RECOLLECT REQUESTED 1.6 - 2.4 mg/dL   EKG, 12 LEAD, INITIAL    Collection Time: 05/18/18  9:22 AM   Result Value Ref Range    Ventricular Rate 118 BPM    Atrial Rate 115 BPM    QRS Duration 130 ms    Q-T Interval 380 ms    QTC Calculation (Bezet) 532 ms    Calculated R Axis 79 degrees    Calculated T Axis -107 degrees    Diagnosis       Atrial fibrillation with rapid ventricular response with premature   ventricular or aberrantly conducted complexes  Nonspecific intraventricular block  Lateral infarct , age undetermined  Cannot rule out Inferior infarct , age undetermined  Abnormal ECG  When compared with ECG of 25-JAN-2014 15:04,  Significant changes have occurred     URINALYSIS W/MICROSCOPIC    Collection Time: 05/18/18  9:27 AM   Result Value Ref Range    Color YELLOW/STRAW      Appearance CLEAR CLEAR      Specific gravity 1.009 1.003 - 1.030      pH (UA) 7.0 5.0 - 8.0      Protein NEGATIVE  NEG mg/dL    Glucose NEGATIVE  NEG mg/dL    Ketone NEGATIVE  NEG mg/dL    Bilirubin NEGATIVE  NEG      Blood NEGATIVE  NEG      Urobilinogen 0.2 0.2 - 1.0 EU/dL    Nitrites NEGATIVE  NEG      Leukocyte Esterase NEGATIVE  NEG      WBC 0-4 0 - 4 /hpf    RBC 0-5 0 - 5 /hpf    Epithelial cells FEW FEW /lpf    Bacteria NEGATIVE  NEG /hpf    Hyaline cast 0-2 0 - 5 /lpf   URINE CULTURE HOLD SAMPLE    Collection Time: 05/18/18  9:27 AM   Result Value Ref Range    Urine culture hold        URINE ON HOLD IN MICROBIOLOGY DEPT FOR 3 DAYS. IF UNPRESERVED URINE IS SUBMITTED, IT CANNOT BE USED FOR ADDITIONAL TESTING AFTER 24 HRS, RECOLLECTION WILL BE REQUIRED.    POTASSIUM    Collection Time: 05/18/18 11:48 AM   Result Value Ref Range    Potassium 4.1 3.5 - 5.1 mmol/L MAGNESIUM    Collection Time: 05/18/18 11:48 AM   Result Value Ref Range    Magnesium 2.0 1.6 - 2.4 mg/dL       IMAGING RESULTS:    Ct Head Wo Cont    Result Date: 5/18/2018  EXAM:  CT HEAD WO CONT INDICATION:   Ground-level fall twice in 12 hours with head injury x 2 COMPARISON: 5/18/2018 at 2:33 AM. CONTRAST:  None. TECHNIQUE: Unenhanced CT of the head was performed using 5 mm images. Brain and bone windows were generated. CT dose reduction was achieved through use of a standardized protocol tailored for this examination and automatic exposure control for dose modulation. FINDINGS: The ventricles and sulci are stable in size, shape and configuration and midline. There is unchanged periventricular white matter disease. There is no intracranial hemorrhage, extra-axial collection, mass, mass effect or midline shift. The basilar cisterns are open. No acute infarct is identified. The bone windows demonstrate no abnormalities. The visualized portions of the paranasal sinuses and mastoid air cells are clear. IMPRESSION: No acute intracranial process. No change from the CT from earlier today. Ct Head Wo Cont    Result Date: 5/18/2018  EXAM:  CT HEAD WO CONT INDICATION:   Trauma. Patient found down on the floor with left forehead soft tissue injury. COMPARISON: None. CONTRAST:  None. TECHNIQUE: Unenhanced CT of the head was performed using 5 mm images. Brain and bone windows were generated. CT dose reduction was achieved through use of a standardized protocol tailored for this examination and automatic exposure control for dose modulation. FINDINGS: There is left scalp soft tissue swelling and laceration defect. There is no acute intracranial hemorrhage, mass, mass effect or herniation. Ventricles and sulci show stable, proportionate, and symmetric prominence. There is a stable pattern of periventricular white matter hypodensity. The gray-white matter differentiation is well-preserved.  Atherosclerotic calcifications are seen within the carotid siphons and vertebral arteries. The mastoid air cells are well pneumatized. The visualized paranasal sinuses are normal.     IMPRESSION: Left frontal scalp swelling and laceration. No acute intracranial hemorrhage, mass or infarct. Stable pattern of atrophy and chronic white matter change most compatible with small vessel ischemic and/or senescent change. Intracranial atherosclerosis. Ct Maxillofacial Wo Cont    Result Date: 5/18/2018  EXAM:  CT MAXILLOFACIAL WO CONT INDICATION:   Trauma. Patient found down with left scalp laceration. COMPARISON:  None. CONTRAST:   None. TECHNIQUE:  Multislice helical CT of the facial bones was performed in the axial plane without intravenous contrast administration. Coronal and sagittal reformations were generated. CT dose reduction was achieved through use of a standardized protocol tailored for this examination and automatic exposure control for dose modulation. FINDINGS: There is left frontal scalp swelling partially visualized. There is no facial fracture or other osseous abnormality. The visualized paranasal sinuses and mastoid air cells are clear. The globes, optic nerves and extraocular muscles are normal. No abnormalities are identified within the visualized portions of the brain or nasopharynx. Atherosclerotic calcifications are noted in the carotid arteries. Visualized cervical spine shows degenerative spine change. IMPRESSION: Left frontal scalp swelling. No acute fracture. Ct Spine Cerv Wo Cont    Result Date: 5/18/2018  EXAM:  CT CERVICAL SPINE WITHOUT CONTRAST INDICATION:   fall, head injury x 2 in 12 hours. COMPARISON: 5/18/2018 at 2:43 AM CONTRAST:  None. TECHNIQUE: Multislice helical CT of the cervical spine was performed at 9:50 AM without intravenous contrast administration. Sagittal and coronal reconstructions were generated.   CT dose reduction was achieved through use of a standardized protocol tailored for this examination and automatic exposure control for dose modulation. FINDINGS: The alignment is unchanged. There is no fracture or subluxation. The odontoid process is intact. The craniocervical junction is within normal limits. Multilevel degenerative changes are again noted. The prevertebral soft tissues are within normal limits. IMPRESSION: No acute fracture or subluxation. No change from the cervical spine CT from earlier today. Ct Spine Cerv Wo Cont    Result Date: 5/18/2018  EXAM:  CT CERVICAL SPINE WITHOUT CONTRAST INDICATION:   Trauma. Patient found down with left forehead laceration. COMPARISON: None. CONTRAST:  None. TECHNIQUE: Multislice helical CT of the cervical spine was performed without intravenous contrast administration. Sagittal and coronal reconstructions were generated. CT dose reduction was achieved through use of a standardized protocol tailored for this examination and automatic exposure control for dose modulation. FINDINGS: The alignment is within normal limits with left port convex cervical scoliosis. There is no fracture or subluxation. The odontoid process is intact. The craniocervical junction is within normal limits. The prevertebral soft tissues are within normal limits. The visualized lung apices show centrilobular septal thickening. C2-C3:  There is no spinal canal or neural foraminal stenosis. C3-C4:  There is right greater than left facet arthropathy and bilateral uncovertebral hypertrophy. Moderate right neural foraminal narrowing and minimal left neural foraminal narrowing. No spinal canal stenosis. C4-C5:  Right greater left facet arthropathy and uncovertebral hypertrophy. Moderate to severe right neural foraminal narrowing. Minimal left neural foraminal narrowing. No spinal canal stenosis. C5-C6:  Prominent right greater than left uncovertebral hypertrophy with minimal facet arthropathy. Moderate to severe right neural foraminal narrowing.  Minimal left neural foraminal narrowing. No spinal canal stenosis. C6-C7:  Bilateral uncovertebral hypertrophy and minimal facet arthropathy bilateral moderate neural foraminal narrowing. No spinal canal stenosis. . C7-T1:  There is no spinal canal or neural foraminal stenosis. IMPRESSION: No fracture or other acute finding. Multilevel degenerative spine changes and leftward cervical scoliosis as above. Suspect interstitial edema in the lungs. MEDICATIONS GIVEN:  Medications   diph,Pertuss(AC),Tet Vac-PF (BOOSTRIX) suspension 0.5 mL (0.5 mL IntraMUSCular Given 5/18/18 1150)   sodium chloride 0.9 % bolus infusion 500 mL (0 mL IntraVENous IV Completed 5/18/18 1141)   lidocaine-EPINEPHrine (XYLOCAINE) 1 %-1:100,000 injection 15 mg (15 mg SubCUTAneous Given by Provider 5/18/18 1335)       IMPRESSION:  1. Fall, initial encounter    2. Injury of head, initial encounter    3. Laceration of scalp, initial encounter    4. Skin tear of left forearm without complication, initial encounter    5. Elevated troponin    6. Atrial fibrillation, unspecified type ([facility] [address]        PLAN:  1. Discharge Medication List as of 5/18/2018  1:10 PM      CONTINUE these medications which have NOT CHANGED    Details   morphine 10 mg/5 mL oral solution Take 1.25 mg by mouth., Historical Med      !! QUEtiapine (SEROQUEL) 50 mg tablet Take 50 mg by mouth two (2) times a day.  Indications: in AM and at 2 pm, Historical Med      !! QUEtiapine (SEROQUEL) 25 mg tablet Take 25 mg by mouth three (3) times daily as needed., Historical Med      !! QUEtiapine (SEROQUEL) 25 mg tablet Take 25 mg by mouth nightly., Historical Med      trimethoprim-sulfamethoxazole (BACTRIM DS) 160-800 mg per tablet Take 1 Tab by mouth two (2) times a day., Historical Med      acetaminophen (TYLENOL EXTRA STRENGTH) 500 mg tablet Take 2 Tabs by mouth every six (6) hours as needed for Pain., Print, Disp-30 Tab, R-0      ibuprofen (MOTRIN) 800 mg tablet Take 1 Tab by mouth every six (6) hours as needed for Pain., Print, Disp-20 Tab, R-0      !! QUEtiapine (SEROQUEL) 25 mg tablet TAKE 1 TABLET BY MOUTH NIGHLY., Normal, Disp-30 Tab, R-3      senna-docusate (PERICOLACE) 8.6-50 mg per tablet Take 1 Tab by mouth two (2) times daily as needed for Constipation. , Normal, Disp-60 Tab, R-11      therapeutic multivitamin (THERAGRAN) tablet Take 1 Tab by mouth daily. , Historical Med      atenolol (TENORMIN) 25 mg tablet Take 25 mg by mouth daily. , Historical Med      aspirin (ASPIRIN) 325 mg tablet Take 325 mg by mouth daily. , Historical Med       !! - Potential duplicate medications found. Please discuss with provider. 2.   Follow-up Information     Follow up With Details Comments Contact Info    [facility] care Schedule an appointment as soon as possible for a visit 2-4 days for recheck. 7-10 days for staple and suture removal          Return to ED if worse       1:13 PM  Pt has been reexamined. Pt has no new complaints, changes or physical findings. Care plan outlined and precautions discussed. All available results were reviewed with pt. All medications were reviewed with pt. All of pt's questions and concerns were addressed. Pt agrees to F/U as instructed and agrees to return to ED upon further deterioration. Pt is ready to go home.   DANIELLE Santana

## 2018-05-18 NOTE — ED NOTES
TRANSFER - OUT REPORT:    Verbal report given to Maxi Perales (name) on Asia Plunkett  being transferred to [facility] NH(unit) for routine progression of care       Report consisted of patients Situation, Background, Assessment and   Recommendations(SBAR). Information from the following report(s) SBAR and ED Summary was reviewed with the receiving nurse. Lines:       Opportunity for questions and clarification was provided.       Patient transported with:   [facility]